# Patient Record
Sex: FEMALE | Race: WHITE | NOT HISPANIC OR LATINO | Employment: OTHER | URBAN - NONMETROPOLITAN AREA
[De-identification: names, ages, dates, MRNs, and addresses within clinical notes are randomized per-mention and may not be internally consistent; named-entity substitution may affect disease eponyms.]

---

## 2017-04-05 RX ORDER — LEVOTHYROXINE SODIUM 0.2 MG/1
200 TABLET ORAL DAILY
COMMUNITY
End: 2022-08-15 | Stop reason: CLARIF

## 2017-04-05 RX ORDER — LITHIUM CARBONATE 600 MG/1
600 CAPSULE ORAL 2 TIMES DAILY WITH MEALS
COMMUNITY
End: 2022-08-15

## 2017-04-05 RX ORDER — QUETIAPINE 400 MG/1
300 TABLET, FILM COATED, EXTENDED RELEASE ORAL NIGHTLY
COMMUNITY

## 2017-04-05 RX ORDER — PROPRANOLOL HYDROCHLORIDE 10 MG/1
20 TABLET ORAL DAILY
COMMUNITY

## 2017-04-05 RX ORDER — HYDROXYZINE PAMOATE 25 MG/1
50 CAPSULE ORAL 3 TIMES DAILY PRN
COMMUNITY

## 2017-04-07 ENCOUNTER — ANESTHESIA EVENT (OUTPATIENT)
Dept: GASTROENTEROLOGY | Facility: HOSPITAL | Age: 52
End: 2017-04-07

## 2017-04-07 ENCOUNTER — ANESTHESIA (OUTPATIENT)
Dept: GASTROENTEROLOGY | Facility: HOSPITAL | Age: 52
End: 2017-04-07

## 2017-04-07 ENCOUNTER — HOSPITAL ENCOUNTER (OUTPATIENT)
Facility: HOSPITAL | Age: 52
Setting detail: HOSPITAL OUTPATIENT SURGERY
Discharge: HOME | End: 2017-04-07
Attending: INTERNAL MEDICINE | Admitting: INTERNAL MEDICINE

## 2017-04-07 VITALS
DIASTOLIC BLOOD PRESSURE: 92 MMHG | HEIGHT: 60 IN | WEIGHT: 196.21 LBS | SYSTOLIC BLOOD PRESSURE: 141 MMHG | BODY MASS INDEX: 38.52 KG/M2 | HEART RATE: 80 BPM | RESPIRATION RATE: 18 BRPM | OXYGEN SATURATION: 98 % | TEMPERATURE: 97.1 F

## 2017-04-07 DIAGNOSIS — K92.2 HEMORRHAGE OF GASTROINTESTINAL TRACT, UNSPECIFIED: ICD-10-CM

## 2017-04-07 DIAGNOSIS — Z80.0 FAMILY HISTORY OF COLON CANCER: ICD-10-CM

## 2017-04-07 PROCEDURE — 25010000002 FENTANYL CITRATE (PF) 100 MCG/2ML SOLUTION: Performed by: NURSE ANESTHETIST, CERTIFIED REGISTERED

## 2017-04-07 PROCEDURE — 25010000002 MIDAZOLAM PER 1 MG: Performed by: NURSE ANESTHETIST, CERTIFIED REGISTERED

## 2017-04-07 PROCEDURE — 25010000002 PROPOFOL 10 MG/ML EMULSION: Performed by: NURSE ANESTHETIST, CERTIFIED REGISTERED

## 2017-04-07 PROCEDURE — 88305 TISSUE EXAM BY PATHOLOGIST: CPT | Performed by: PATHOLOGY

## 2017-04-07 PROCEDURE — 88305 TISSUE EXAM BY PATHOLOGIST: CPT | Performed by: INTERNAL MEDICINE

## 2017-04-07 RX ORDER — DEXTROSE AND SODIUM CHLORIDE 5; .45 G/100ML; G/100ML
20 INJECTION, SOLUTION INTRAVENOUS CONTINUOUS
Status: DISCONTINUED | OUTPATIENT
Start: 2017-04-07 | End: 2017-04-07 | Stop reason: HOSPADM

## 2017-04-07 RX ORDER — PROMETHAZINE HYDROCHLORIDE 25 MG/ML
12.5 INJECTION, SOLUTION INTRAMUSCULAR; INTRAVENOUS ONCE AS NEEDED
Status: DISCONTINUED | OUTPATIENT
Start: 2017-04-07 | End: 2017-04-07 | Stop reason: HOSPADM

## 2017-04-07 RX ORDER — MIDAZOLAM HYDROCHLORIDE 1 MG/ML
INJECTION INTRAMUSCULAR; INTRAVENOUS AS NEEDED
Status: DISCONTINUED | OUTPATIENT
Start: 2017-04-07 | End: 2017-04-07 | Stop reason: SURG

## 2017-04-07 RX ORDER — PROMETHAZINE HYDROCHLORIDE 25 MG/1
25 TABLET ORAL ONCE AS NEEDED
Status: DISCONTINUED | OUTPATIENT
Start: 2017-04-07 | End: 2017-04-07 | Stop reason: HOSPADM

## 2017-04-07 RX ORDER — PROMETHAZINE HYDROCHLORIDE 25 MG/1
25 SUPPOSITORY RECTAL ONCE AS NEEDED
Status: DISCONTINUED | OUTPATIENT
Start: 2017-04-07 | End: 2017-04-07 | Stop reason: HOSPADM

## 2017-04-07 RX ORDER — PROPOFOL 10 MG/ML
VIAL (ML) INTRAVENOUS AS NEEDED
Status: DISCONTINUED | OUTPATIENT
Start: 2017-04-07 | End: 2017-04-07

## 2017-04-07 RX ORDER — ONDANSETRON 2 MG/ML
4 INJECTION INTRAMUSCULAR; INTRAVENOUS ONCE AS NEEDED
Status: DISCONTINUED | OUTPATIENT
Start: 2017-04-07 | End: 2017-04-07 | Stop reason: HOSPADM

## 2017-04-07 RX ORDER — FENTANYL CITRATE 50 UG/ML
INJECTION, SOLUTION INTRAMUSCULAR; INTRAVENOUS AS NEEDED
Status: DISCONTINUED | OUTPATIENT
Start: 2017-04-07 | End: 2017-04-07 | Stop reason: SURG

## 2017-04-07 RX ORDER — SODIUM CHLORIDE, SODIUM GLUCONATE, SODIUM ACETATE, POTASSIUM CHLORIDE, AND MAGNESIUM CHLORIDE 526; 502; 368; 37; 30 MG/100ML; MG/100ML; MG/100ML; MG/100ML; MG/100ML
INJECTION, SOLUTION INTRAVENOUS CONTINUOUS PRN
Status: DISCONTINUED | OUTPATIENT
Start: 2017-04-07 | End: 2017-04-07 | Stop reason: SURG

## 2017-04-07 RX ORDER — DEXAMETHASONE SODIUM PHOSPHATE 4 MG/ML
8 INJECTION, SOLUTION INTRA-ARTICULAR; INTRALESIONAL; INTRAMUSCULAR; INTRAVENOUS; SOFT TISSUE ONCE AS NEEDED
Status: DISCONTINUED | OUTPATIENT
Start: 2017-04-07 | End: 2017-04-07 | Stop reason: HOSPADM

## 2017-04-07 RX ORDER — PROPOFOL 10 MG/ML
VIAL (ML) INTRAVENOUS AS NEEDED
Status: DISCONTINUED | OUTPATIENT
Start: 2017-04-07 | End: 2017-04-07 | Stop reason: SURG

## 2017-04-07 RX ADMIN — PROPOFOL 30 MG: 10 INJECTION, EMULSION INTRAVENOUS at 14:16

## 2017-04-07 RX ADMIN — PROPOFOL 40 MG: 10 INJECTION, EMULSION INTRAVENOUS at 14:08

## 2017-04-07 RX ADMIN — MIDAZOLAM 2 MG: 1 INJECTION INTRAMUSCULAR; INTRAVENOUS at 14:09

## 2017-04-07 RX ADMIN — DEXTROSE AND SODIUM CHLORIDE 20 ML/HR: 5; 450 INJECTION, SOLUTION INTRAVENOUS at 13:00

## 2017-04-07 RX ADMIN — FENTANYL CITRATE 100 MCG: 50 INJECTION, SOLUTION INTRAMUSCULAR; INTRAVENOUS at 14:09

## 2017-04-07 RX ADMIN — SODIUM CHLORIDE, SODIUM GLUCONATE, SODIUM ACETATE, POTASSIUM CHLORIDE, AND MAGNESIUM CHLORIDE: 526; 502; 368; 37; 30 INJECTION, SOLUTION INTRAVENOUS at 14:10

## 2017-04-07 RX ADMIN — PROPOFOL 20 MG: 10 INJECTION, EMULSION INTRAVENOUS at 14:12

## 2017-04-07 NOTE — H&P
Emir Tilley DO,[facility]  Gastroenterology  Hepatology  Endoscopy  Board Certified in Internal Medicine and gastroenterology  [address], [address]  [place]. [address]  - [phone]   F - [phone]     GASTROENTEROLOGY HISTORY AND PHYSICAL  NOTE   EMIR TILLEY DO.         SUBJECTIVE:   4/7/2017    Name: Yahaira Gurrola  DOD: 1965        Chief Complaint:       Subjective : Rectal bleeding with a family history of colon cancer    Patient is 51 y.o. female presents with desire for elective colonoscopy.  The patient has had intermittent rectal bleeding and a family history of colon cancer in mother at 70   ROS/HISTORY/ CURRENT MEDICATIONS/OBJECTIVE/VS/PE:   Review of Systems:   Review of Systems    History:     Past Medical History:   Diagnosis Date   • Bipolar 1 disorder    • Disease of thyroid gland    • PONV (postoperative nausea and vomiting)      Past Surgical History:   Procedure Laterality Date   • COLONOSCOPY     • HYSTERECTOMY       History reviewed. No pertinent family history.  Social History   Substance Use Topics   • Smoking status: Never Smoker   • Smokeless tobacco: Never Used   • Alcohol use No     Prescriptions Prior to Admission   Medication Sig Dispense Refill Last Dose   • hydrOXYzine (VISTARIL) 25 MG capsule Take 25 mg by mouth 3 (Three) Times a Day As Needed for Itching.   4/6/2017 at Unknown time   • levothyroxine (SYNTHROID) 200 MCG tablet Take 200 mcg by mouth Daily.   4/6/2017 at Unknown time   • lithium 600 MG capsule Take 600 mg by mouth 2 (Two) Times a Day With Meals.   4/6/2017 at Unknown time   • propranolol (INDERAL) 10 MG tablet Take 10 mg by mouth 2 (Two) Times a Day As Needed.   4/6/2017 at Unknown time   • QUEtiapine XR (SEROQUEL XR) 400 MG 24 hr tablet Take 800 mg by mouth Every Night.   4/6/2017 at Unknown time     Allergies:  Geodon [ziprasidone hcl] and Sulfa antibiotics    I have reviewed the patients medical history, surgical history and  family history in the available medical record system.     Current Medications:     Current Facility-Administered Medications   Medication Dose Route Frequency Provider Last Rate Last Dose   • dexamethasone (DECADRON) injection 8 mg  8 mg Intravenous Once PRN Mirian Drake CRNA       • dextrose 5 % and sodium chloride 0.45 % infusion  20 mL/hr Intravenous Continuous James Simpson DO 20 mL/hr at 04/07/17 1300 20 mL/hr at 04/07/17 1300   • meperidine (DEMEROL) injection 12.5 mg  12.5 mg Intravenous Q5 Min PRN Mirian Drake CRNA       • ondansetron (ZOFRAN) injection 4 mg  4 mg Intravenous Once PRN Mirian Drake CRNA       • promethazine (PHENERGAN) injection 12.5 mg  12.5 mg Intravenous Once PRN Mirian Drake CRNA        Or   • promethazine (PHENERGAN) injection 12.5 mg  12.5 mg Intramuscular Once PRN Mirian Drake CRNA        Or   • promethazine (PHENERGAN) suppository 25 mg  25 mg Rectal Once PRN Mirian Drake CRNA        Or   • promethazine (PHENERGAN) tablet 25 mg  25 mg Oral Once PRN Mirian Drake CRNA           Objective     Physical Exam:   Temp:  [97.7 °F (36.5 °C)] 97.7 °F (36.5 °C)  Heart Rate:  [73] 73  Resp:  [16] 16  BP: (186)/(87) 186/87    Physical Exam:  General Appearance:    Alert, cooperative, in no acute distress   Head:    Normocephalic, without obvious abnormality, atraumatic   Eyes:            Lids and lashes normal, conjunctivae and sclerae normal, no   icterus, no pallor, corneas clear, PERRLA   Ears:    Ears appear intact with no abnormalities noted   Throat:   No oral lesions, no thrush, oral mucosa moist   Neck:   No adenopathy, supple, trachea midline, no thyromegaly, no     carotid bruit, no JVD   Back:     No kyphosis present, no scoliosis present, no skin lesions,       erythema or scars, no tenderness to percussion or                   palpation,   range of motion normal    Lungs:     Clear to auscultation,respirations regular, even and                   unlabored    Heart:    Regular rhythm and normal rate, normal S1 and S2, no            murmur, no gallop, no rub, no click   Breast Exam:    Deferred   Abdomen:     Normal bowel sounds, no masses, no organomegaly, soft        non-tender, non-distended, no guarding, no rebound                 tenderness   Genitalia:    Deferred   Extremities:   Moves all extremities well, no edema, no cyanosis, no              redness   Pulses:   Pulses palpable and equal bilaterally   Skin:   No bleeding, bruising or rash   Lymph nodes:   No palpable adenopathy   Neurologic:   Cranial nerves 2 - 12 grossly intact, sensation intact, DTR        present and equal bilaterally      Results Review:     Lab Results   Component Value Date    WBC 5.9 04/29/2015    WBC 6.3 03/12/2014    HGB 13.1 04/29/2015    HCT 38.6 04/29/2015     04/29/2015             No results found for: LIPASE  No results found for: INR       Radiology Review:  Imaging Results (last 72 hours)     ** No results found for the last 72 hours. **           I reviewed the patient's new clinical results.  I reviewed the patient's new imaging results and agree with the interpretation.     ASSESSMENT/PLAN:   ASSESSMENT:   1.  Rectal bleeding  2.  Family history of colon cancer    PLAN:   1.  Colonoscopy    Risk and benefits associated with the procedure are reviewed with the patient.  She wishes to proceed      James Simpson DO  04/07/17  2:05 PM

## 2017-04-07 NOTE — PLAN OF CARE
Problem: GI Endoscopy (Adult)  Goal: Signs and Symptoms of Listed Potential Problems Will be Absent or Manageable (GI Endoscopy)  Outcome: Ongoing (interventions implemented as appropriate)    04/07/17 1424   GI Endoscopy   Problems Assessed (GI Endoscopy) all   Problems Present (GI Endoscopy) none         Problem: Patient Care Overview (Adult)  Goal: Plan of Care Review  Outcome: Ongoing (interventions implemented as appropriate)    04/07/17 1424   Coping/Psychosocial Response Interventions   Plan Of Care Reviewed With patient   Patient Care Overview   Progress no change

## 2017-04-07 NOTE — ANESTHESIA POSTPROCEDURE EVALUATION
Patient: Yahaira Gurrola    Procedure Summary     Date Anesthesia Start Anesthesia Stop Room / Location    04/07/17 [id number] [facility] ENDOSCOPY 2 / [facility] ENDOSCOPY       Procedure Diagnosis Surgeon Provider    COLONOSCOPY (N/A ) Hemorrhage of gastrointestinal tract, unspecified; Family history of colon cancer  (FAMILY HX OF COLON CANCER) DO Mirian Putnam CRNA          Anesthesia Type: MAC  Last vitals  BP     Temp      Pulse     Resp      SpO2        Post Anesthesia Care and Evaluation    Patient location during evaluation: bedside  Patient participation: complete - patient participated  Level of consciousness: awake and alert  Pain score: 0  Pain management: adequate  Airway patency: patent  Anesthetic complications: No anesthetic complications  PONV Status: none  Cardiovascular status: acceptable  Respiratory status: acceptable  Hydration status: acceptable

## 2017-04-07 NOTE — PLAN OF CARE
Problem: GI Endoscopy (Adult)  Goal: Signs and Symptoms of Listed Potential Problems Will be Absent or Manageable (GI Endoscopy)  Outcome: Outcome(s) achieved Date Met:  04/07/17 04/07/17 1432   GI Endoscopy   Problems Assessed (GI Endoscopy) all   Problems Present (GI Endoscopy) none         Problem: Patient Care Overview (Adult)  Goal: Plan of Care Review  Outcome: Outcome(s) achieved Date Met:  04/07/17 04/07/17 1432   Coping/Psychosocial Response Interventions   Plan Of Care Reviewed With patient   Patient Care Overview   Progress no change   Outcome Evaluation   Outcome Summary/Follow up Plan vss, pt alert

## 2017-04-07 NOTE — ANESTHESIA PREPROCEDURE EVALUATION
Anesthesia Evaluation     history of anesthetic complications:  NPO Status: > 8 hours   Airway   Mallampati: II  TM distance: >3 FB  Neck ROM: full  no difficulty expected  Dental          Pulmonary - negative pulmonary ROS and normal exam   Cardiovascular - negative cardio ROS and normal exam        Neuro/Psych  (+) psychiatric history,    GI/Hepatic/Renal/Endo - negative ROS     Musculoskeletal (-) negative ROS    Abdominal    Substance History - negative use     OB/GYN negative ob/gyn ROS         Other - negative ROS                                 Anesthesia Plan    ASA 2     MAC     Anesthetic plan and risks discussed with patient.

## 2017-04-10 LAB
LAB AP CASE REPORT: NORMAL
Lab: NORMAL
PATH REPORT.FINAL DX SPEC: NORMAL
PATH REPORT.GROSS SPEC: NORMAL

## 2018-07-11 ENCOUNTER — HOSPITAL ENCOUNTER (OUTPATIENT)
Facility: HOSPITAL | Age: 53
Setting detail: OBSERVATION
Discharge: HOME | End: 2018-07-12
Attending: INTERNAL MEDICINE | Admitting: INTERNAL MEDICINE

## 2018-07-11 PROBLEM — R07.9 CHEST PAIN: Status: ACTIVE | Noted: 2018-07-11

## 2018-07-11 LAB
ALBUMIN SERPL-MCNC: 4.6 G/DL (ref 3.4–4.8)
ALBUMIN/GLOB SERPL: 1.8 G/DL (ref 1.1–1.8)
ALP SERPL-CCNC: 78 U/L (ref 38–126)
ALT SERPL W P-5'-P-CCNC: 69 U/L (ref 9–52)
ANION GAP SERPL CALCULATED.3IONS-SCNC: 12 MMOL/L (ref 5–15)
ARTICHOKE IGE QN: 142 MG/DL (ref 1–129)
AST SERPL-CCNC: 37 U/L (ref 14–36)
BASOPHILS # BLD AUTO: 0.03 10*3/MM3 (ref 0–0.2)
BASOPHILS NFR BLD AUTO: 0.4 % (ref 0–2)
BILIRUB SERPL-MCNC: 0.7 MG/DL (ref 0.2–1.3)
BUN BLD-MCNC: 12 MG/DL (ref 7–21)
BUN/CREAT SERPL: 16.9 (ref 7–25)
CALCIUM SPEC-SCNC: 9.9 MG/DL (ref 8.4–10.2)
CHLORIDE SERPL-SCNC: 111 MMOL/L (ref 95–110)
CHOLEST SERPL-MCNC: 206 MG/DL (ref 0–199)
CO2 SERPL-SCNC: 20 MMOL/L (ref 22–31)
CREAT BLD-MCNC: 0.71 MG/DL (ref 0.5–1)
DEPRECATED RDW RBC AUTO: 40.5 FL (ref 36.4–46.3)
EOSINOPHIL # BLD AUTO: 0.14 10*3/MM3 (ref 0–0.7)
EOSINOPHIL NFR BLD AUTO: 1.9 % (ref 0–7)
ERYTHROCYTE [DISTWIDTH] IN BLOOD BY AUTOMATED COUNT: 12.7 % (ref 11.5–14.5)
GFR SERPL CREATININE-BSD FRML MDRD: 87 ML/MIN/1.73 (ref 51–120)
GLOBULIN UR ELPH-MCNC: 2.5 GM/DL (ref 2.3–3.5)
GLUCOSE BLD-MCNC: 162 MG/DL (ref 60–100)
GLUCOSE BLDC GLUCOMTR-MCNC: 160 MG/DL (ref 70–130)
GLUCOSE BLDC GLUCOMTR-MCNC: 180 MG/DL (ref 70–130)
HBA1C MFR BLD: 6.2 % (ref 4–5.6)
HCT VFR BLD AUTO: 36.6 % (ref 35–45)
HDLC SERPL-MCNC: 53 MG/DL (ref 60–200)
HGB BLD-MCNC: 13.2 G/DL (ref 12–15.5)
IMM GRANULOCYTES # BLD: 0.02 10*3/MM3 (ref 0–0.02)
IMM GRANULOCYTES NFR BLD: 0.3 % (ref 0–0.5)
INR PPP: 1.09 (ref 0.8–1.2)
LDLC/HDLC SERPL: 2.51 {RATIO} (ref 0–3.22)
LITHIUM SERPL-SCNC: 0.7 MMOL/L (ref 0.6–1.2)
LYMPHOCYTES # BLD AUTO: 1.96 10*3/MM3 (ref 0.6–4.2)
LYMPHOCYTES NFR BLD AUTO: 27 % (ref 10–50)
MCH RBC QN AUTO: 31.7 PG (ref 26.5–34)
MCHC RBC AUTO-ENTMCNC: 36.1 G/DL (ref 31.4–36)
MCV RBC AUTO: 87.8 FL (ref 80–98)
MONOCYTES # BLD AUTO: 0.42 10*3/MM3 (ref 0–0.9)
MONOCYTES NFR BLD AUTO: 5.8 % (ref 0–12)
NEUTROPHILS # BLD AUTO: 4.68 10*3/MM3 (ref 2–8.6)
NEUTROPHILS NFR BLD AUTO: 64.6 % (ref 37–80)
NRBC BLD MANUAL-RTO: 0 /100 WBC (ref 0–0)
PLATELET # BLD AUTO: 215 10*3/MM3 (ref 150–450)
PMV BLD AUTO: 10.4 FL (ref 8–12)
POTASSIUM BLD-SCNC: 3.8 MMOL/L (ref 3.5–5.1)
PROT SERPL-MCNC: 7.1 G/DL (ref 6.3–8.6)
PROTHROMBIN TIME: 13.9 SECONDS (ref 11.1–15.3)
RBC # BLD AUTO: 4.17 10*6/MM3 (ref 3.77–5.16)
SODIUM BLD-SCNC: 143 MMOL/L (ref 137–145)
TRIGL SERPL-MCNC: 101 MG/DL (ref 20–199)
TROPONIN I SERPL-MCNC: <0.012 NG/ML
TSH SERPL DL<=0.05 MIU/L-ACNC: 0.34 MIU/ML (ref 0.46–4.68)
WBC NRBC COR # BLD: 7.25 10*3/MM3 (ref 3.2–9.8)

## 2018-07-11 PROCEDURE — 25010000002 ONDANSETRON PER 1 MG: Performed by: INTERNAL MEDICINE

## 2018-07-11 PROCEDURE — 96372 THER/PROPH/DIAG INJ SC/IM: CPT

## 2018-07-11 PROCEDURE — G0378 HOSPITAL OBSERVATION PER HR: HCPCS

## 2018-07-11 PROCEDURE — 80178 ASSAY OF LITHIUM: CPT | Performed by: INTERNAL MEDICINE

## 2018-07-11 PROCEDURE — 63710000001 INSULIN ASPART PER 5 UNITS: Performed by: INTERNAL MEDICINE

## 2018-07-11 PROCEDURE — 93010 ELECTROCARDIOGRAM REPORT: CPT | Performed by: INTERNAL MEDICINE

## 2018-07-11 PROCEDURE — 83036 HEMOGLOBIN GLYCOSYLATED A1C: CPT | Performed by: INTERNAL MEDICINE

## 2018-07-11 PROCEDURE — 85610 PROTHROMBIN TIME: CPT | Performed by: INTERNAL MEDICINE

## 2018-07-11 PROCEDURE — 80053 COMPREHEN METABOLIC PANEL: CPT | Performed by: INTERNAL MEDICINE

## 2018-07-11 PROCEDURE — 93005 ELECTROCARDIOGRAM TRACING: CPT | Performed by: INTERNAL MEDICINE

## 2018-07-11 PROCEDURE — 96374 THER/PROPH/DIAG INJ IV PUSH: CPT

## 2018-07-11 PROCEDURE — 25010000002 ENOXAPARIN PER 10 MG: Performed by: INTERNAL MEDICINE

## 2018-07-11 PROCEDURE — 82962 GLUCOSE BLOOD TEST: CPT

## 2018-07-11 PROCEDURE — 99204 OFFICE O/P NEW MOD 45 MIN: CPT | Performed by: INTERNAL MEDICINE

## 2018-07-11 PROCEDURE — 84443 ASSAY THYROID STIM HORMONE: CPT | Performed by: INTERNAL MEDICINE

## 2018-07-11 PROCEDURE — 85025 COMPLETE CBC W/AUTO DIFF WBC: CPT | Performed by: INTERNAL MEDICINE

## 2018-07-11 PROCEDURE — 96361 HYDRATE IV INFUSION ADD-ON: CPT

## 2018-07-11 PROCEDURE — 80061 LIPID PANEL: CPT | Performed by: INTERNAL MEDICINE

## 2018-07-11 PROCEDURE — 84484 ASSAY OF TROPONIN QUANT: CPT | Performed by: INTERNAL MEDICINE

## 2018-07-11 RX ORDER — ASPIRIN 81 MG/1
81 TABLET ORAL DAILY
Status: DISCONTINUED | OUTPATIENT
Start: 2018-07-12 | End: 2018-07-12 | Stop reason: HOSPADM

## 2018-07-11 RX ORDER — LITHIUM CARBONATE 150 MG/1
600 CAPSULE ORAL 2 TIMES DAILY WITH MEALS
Status: DISCONTINUED | OUTPATIENT
Start: 2018-07-11 | End: 2018-07-12

## 2018-07-11 RX ORDER — ASPIRIN 81 MG/1
324 TABLET, CHEWABLE ORAL ONCE
Status: COMPLETED | OUTPATIENT
Start: 2018-07-11 | End: 2018-07-11

## 2018-07-11 RX ORDER — ATORVASTATIN CALCIUM 20 MG/1
20 TABLET, FILM COATED ORAL NIGHTLY
Status: DISCONTINUED | OUTPATIENT
Start: 2018-07-11 | End: 2018-07-12 | Stop reason: HOSPADM

## 2018-07-11 RX ORDER — PROPRANOLOL HYDROCHLORIDE 10 MG/1
10 TABLET ORAL EVERY 12 HOURS SCHEDULED
Status: DISCONTINUED | OUTPATIENT
Start: 2018-07-11 | End: 2018-07-12 | Stop reason: HOSPADM

## 2018-07-11 RX ORDER — ONDANSETRON 2 MG/ML
4 INJECTION INTRAMUSCULAR; INTRAVENOUS EVERY 6 HOURS PRN
Status: DISCONTINUED | OUTPATIENT
Start: 2018-07-11 | End: 2018-07-12 | Stop reason: HOSPADM

## 2018-07-11 RX ORDER — MORPHINE SULFATE 2 MG/ML
1 INJECTION, SOLUTION INTRAMUSCULAR; INTRAVENOUS EVERY 4 HOURS PRN
Status: DISCONTINUED | OUTPATIENT
Start: 2018-07-11 | End: 2018-07-12 | Stop reason: HOSPADM

## 2018-07-11 RX ORDER — SODIUM CHLORIDE 9 MG/ML
75 INJECTION, SOLUTION INTRAVENOUS CONTINUOUS
Status: DISCONTINUED | OUTPATIENT
Start: 2018-07-11 | End: 2018-07-12 | Stop reason: HOSPADM

## 2018-07-11 RX ORDER — METOPROLOL TARTRATE 5 MG/5ML
2.5 INJECTION INTRAVENOUS ONCE AS NEEDED
Status: DISCONTINUED | OUTPATIENT
Start: 2018-07-11 | End: 2018-07-12 | Stop reason: HOSPADM

## 2018-07-11 RX ORDER — LEVOTHYROXINE SODIUM 0.1 MG/1
200 TABLET ORAL DAILY
Status: DISCONTINUED | OUTPATIENT
Start: 2018-07-11 | End: 2018-07-12 | Stop reason: HOSPADM

## 2018-07-11 RX ORDER — CALCIUM CARBONATE 200(500)MG
1 TABLET,CHEWABLE ORAL 3 TIMES DAILY PRN
Status: DISCONTINUED | OUTPATIENT
Start: 2018-07-11 | End: 2018-07-12 | Stop reason: HOSPADM

## 2018-07-11 RX ORDER — ACETAMINOPHEN 325 MG/1
650 TABLET ORAL EVERY 6 HOURS PRN
Status: DISCONTINUED | OUTPATIENT
Start: 2018-07-11 | End: 2018-07-12 | Stop reason: HOSPADM

## 2018-07-11 RX ORDER — HYDROXYZINE PAMOATE 25 MG/1
25 CAPSULE ORAL 3 TIMES DAILY PRN
Status: DISCONTINUED | OUTPATIENT
Start: 2018-07-11 | End: 2018-07-12 | Stop reason: HOSPADM

## 2018-07-11 RX ORDER — DIPHENHYDRAMINE HYDROCHLORIDE 50 MG/ML
25 INJECTION INTRAMUSCULAR; INTRAVENOUS ONCE
Status: DISCONTINUED | OUTPATIENT
Start: 2018-07-11 | End: 2018-07-12 | Stop reason: HOSPADM

## 2018-07-11 RX ORDER — DEXTROSE MONOHYDRATE 25 G/50ML
25 INJECTION, SOLUTION INTRAVENOUS
Status: DISCONTINUED | OUTPATIENT
Start: 2018-07-11 | End: 2018-07-12 | Stop reason: HOSPADM

## 2018-07-11 RX ORDER — NALOXONE HCL 0.4 MG/ML
0.4 VIAL (ML) INJECTION
Status: DISCONTINUED | OUTPATIENT
Start: 2018-07-11 | End: 2018-07-12 | Stop reason: HOSPADM

## 2018-07-11 RX ORDER — NITROGLYCERIN 0.4 MG/1
0.4 TABLET SUBLINGUAL
Status: DISCONTINUED | OUTPATIENT
Start: 2018-07-11 | End: 2018-07-12 | Stop reason: HOSPADM

## 2018-07-11 RX ORDER — BISACODYL 5 MG/1
5 TABLET, DELAYED RELEASE ORAL DAILY PRN
Status: DISCONTINUED | OUTPATIENT
Start: 2018-07-11 | End: 2018-07-12 | Stop reason: HOSPADM

## 2018-07-11 RX ORDER — LOSARTAN POTASSIUM 25 MG/1
25 TABLET ORAL
Status: DISCONTINUED | OUTPATIENT
Start: 2018-07-11 | End: 2018-07-12 | Stop reason: HOSPADM

## 2018-07-11 RX ORDER — SODIUM CHLORIDE 0.9 % (FLUSH) 0.9 %
1-10 SYRINGE (ML) INJECTION AS NEEDED
Status: DISCONTINUED | OUTPATIENT
Start: 2018-07-11 | End: 2018-07-12 | Stop reason: HOSPADM

## 2018-07-11 RX ORDER — HYDRALAZINE HYDROCHLORIDE 20 MG/ML
10 INJECTION INTRAMUSCULAR; INTRAVENOUS EVERY 6 HOURS PRN
Status: DISCONTINUED | OUTPATIENT
Start: 2018-07-11 | End: 2018-07-12 | Stop reason: HOSPADM

## 2018-07-11 RX ORDER — NICOTINE POLACRILEX 4 MG
15 LOZENGE BUCCAL
Status: DISCONTINUED | OUTPATIENT
Start: 2018-07-11 | End: 2018-07-12 | Stop reason: HOSPADM

## 2018-07-11 RX ADMIN — ENOXAPARIN SODIUM 40 MG: 40 INJECTION SUBCUTANEOUS at 16:54

## 2018-07-11 RX ADMIN — LOSARTAN POTASSIUM 25 MG: 25 TABLET ORAL at 16:55

## 2018-07-11 RX ADMIN — HYDROXYZINE PAMOATE 25 MG: 25 CAPSULE ORAL at 16:55

## 2018-07-11 RX ADMIN — ASPIRIN 81 MG 324 MG: 81 TABLET ORAL at 16:55

## 2018-07-11 RX ADMIN — ATORVASTATIN CALCIUM 20 MG: 20 TABLET, FILM COATED ORAL at 20:43

## 2018-07-11 RX ADMIN — SODIUM CHLORIDE 75 ML/HR: 9 INJECTION, SOLUTION INTRAVENOUS at 16:56

## 2018-07-11 RX ADMIN — QUETIAPINE 800 MG: 100 TABLET ORAL at 21:13

## 2018-07-11 RX ADMIN — INSULIN ASPART 2 UNITS: 100 INJECTION, SOLUTION INTRAVENOUS; SUBCUTANEOUS at 16:58

## 2018-07-11 RX ADMIN — PROPRANOLOL HYDROCHLORIDE 10 MG: 10 TABLET ORAL at 20:43

## 2018-07-11 RX ADMIN — LITHIUM CARBONATE 600 MG: 150 CAPSULE, GELATIN COATED ORAL at 17:01

## 2018-07-11 RX ADMIN — ONDANSETRON 4 MG: 2 INJECTION, SOLUTION INTRAMUSCULAR; INTRAVENOUS at 16:55

## 2018-07-11 RX ADMIN — INSULIN ASPART 2 UNITS: 100 INJECTION, SOLUTION INTRAVENOUS; SUBCUTANEOUS at 20:43

## 2018-07-11 NOTE — CONSULTS
Cardiology at [facility]  Cardiovascular Consultation Note      Yahaira Gurrola  418/1  [id number]  1965    DATE OF ADMISSION: 7/11/2018  DATE OF CONSULTATION:  7/11/2018    Harmeet Ríos MD  Treatment Team:   Attending Provider: Quique Horne MD  Admitting Provider: Quique Horne MD    No chief complaint on file.      Chief complaint/ Reason for Consultation: Chest pain with risk factor      History of Present Illness   52 y.o. female with history of bipolar disorder, hypothyroidism, diabetes mellitus, obesity ,family history of heart disease transferred from [facility] to [facility] due to chest pain.  Patient has had chest pain off and on for the past 1 month but the pain got worse today.  Pain did radiate to the left upper extremity and was associated with nausea, diaphoresis and shortness of air. She denies vomiting, fever, chills, hematemesis, melena, hematochezia, palpitation, syncope or presyncope.  She had investigations at [facility] emergency department and noted to have normal initial troponin and unremarkable CBC and electrolytes.  Chest x-ray was normal except for cardiomegaly and EKG showed sinus bradycardia with nonspecific ST-T wave changes.  She was treated with aspirin and nitroglycerin patch was placed prior to transfer.   Past Medical History:   Diagnosis Date   • Bipolar 1 disorder (CMS/HCC)    • Disease of thyroid gland    • PONV (postoperative nausea and vomiting)        Past Surgical History:   Procedure Laterality Date   • COLONOSCOPY     • COLONOSCOPY N/A 4/7/2017    Procedure: COLONOSCOPY;  Surgeon: James Simpson DO;  Location: [facility] ENDOSCOPY;  Service:    • HYSTERECTOMY         Allergies   Allergen Reactions   • Geodon [Ziprasidone Hcl] Nausea And Vomiting   • Sulfa Antibiotics Nausea And Vomiting       No current facility-administered medications on file prior to encounter.      Current Outpatient Prescriptions on  File Prior to Encounter   Medication Sig Dispense Refill   • hydrOXYzine (VISTARIL) 25 MG capsule Take 25 mg by mouth 3 (Three) Times a Day As Needed for Itching.     • levothyroxine (SYNTHROID) 200 MCG tablet Take 200 mcg by mouth Daily.     • lithium 600 MG capsule Take 600 mg by mouth 2 (Two) Times a Day With Meals.     • propranolol (INDERAL) 10 MG tablet Take 10 mg by mouth 2 (Two) Times a Day As Needed.     • QUEtiapine XR (SEROQUEL XR) 400 MG 24 hr tablet Take 800 mg by mouth Every Night.         Social History     Social History   • Marital status:      Spouse name: N/A   • Number of children: N/A   • Years of education: N/A     Occupational History   • Not on file.     Social History Main Topics   • Smoking status: Never Smoker   • Smokeless tobacco: Never Used   • Alcohol use No   • Drug use: No   • Sexual activity: Defer     Other Topics Concern   • Not on file     Social History Narrative   • No narrative on file           REVIEW OF SYSTEMS:   ROS      Constitutional: Positive for activity change and diaphoresis. Negative for appetite change, chills, fatigue and fever.   HENT: Negative for trouble swallowing and voice change.    Eyes: Negative for photophobia and visual disturbance.   Respiratory: Positive for shortness of breath. Negative for cough, choking,   Cardiovascular: Positive for chest pain. Negative for palpitations and leg swelling.   Gastrointestinal: Positive for nausea. Negative for abdominal distention,.   Endocrine: Negative for cold intolerance, heat intolerance, polydipsia  Genitourinary: Negative for decreased urine volume, difficulty urinating, dysuria, enuresis, flank pain, frequency, hematuria and urgency.   Musculoskeletal: Negative for arthralgias, gait problem, myalgias, neck pain and neck stiffness.   Skin: Negative for pallor, rash and wound.   Neurological: Negative for dizziness, tremors, seizures, syncope,  Hematological: Does not bruise/bleed easily.  "  Psychiatric/Behavioral: Negative for agitation, behavioral problems   Objective:     Vitals:    07/11/18 1500 07/11/18 1652 07/11/18 1723   BP: (!) 188/78 142/68    Pulse: 55  70   Resp: 22     Temp: 96.7 °F (35.9 °C)     TempSrc: Oral     SpO2: 100%     Weight: 87.8 kg (193 lb 9.6 oz)     Height: 154.9 cm (61\")       Body mass index is 36.58 kg/m².  Flowsheet Rows      First Filed Value   Admission Height  154.9 cm (61\") Documented at 07/11/2018 1500   Admission Weight  87.8 kg (193 lb 9.6 oz) Documented at 07/11/2018 1500        No intake or output data in the 24 hours ending 07/11/18 1803      Physical Exam   Physical Exam  Constitutional: She is oriented to person, place, and time. She appears well-developed and well-nourished. She is cooperative. No distress.   Patient is obese.   HENT:   Head: Normocephalic and atraumatic.     Neck: Normal range of motion. Neck supple. No JVD present. No thyromegaly present.   Cardiovascular: Normal rate, regular rhythm, normal heart sounds and intact distal pulses.  Exam reveals no gallop and no friction rub.    No murmur heard.  Pulmonary/Chest: Effort normal and breath sounds normal. No stridor. No respiratory distress. She has no wheezes. She has no rales. She exhibits no tenderness.     Abdominal: Soft. Bowel sounds are normal. She exhibits no distension and no mass. There is no tenderness. There is no rebound and no guarding. No hernia.   Musculoskeletal: Normal range of motion. She exhibits no edema, tenderness or deformity.   Neurological: She is alert and oriented to person, place, and time. She has normal reflexes. She displays normal reflexes. No cranial nerve deficit or sensory deficit. She exhibits normal muscle tone. Coordination normal.   Skin: Skin is warm and dry. No rash noted. She is not diaphoretic. No erythema. No pallor.   Psychiatric: She has a normal mood and affect. Her behavior is normal  Radiology Review    No orders to display       Lab " Results:  Lab Results (last 24 hours)     Procedure Component Value Units Date/Time    POC Glucose Once [[id number]]  (Abnormal) Collected:  07/11/18 1656    Specimen:  Blood Updated:  07/11/18 1708     Glucose 180 (H) mg/dL      Comment: RN NotifiedMeter: [id number]: [id number] HUGO PATEL       Troponin [[id number]]  (Normal) Collected:  07/11/18 1611    Specimen:  Blood Updated:  07/11/18 1659     Troponin I <0.012 ng/mL     Lipid Panel [[id number]]  (Abnormal) Collected:  07/11/18 1611    Specimen:  Blood Updated:  07/11/18 1657     Total Cholesterol 206 (H) mg/dL      Triglycerides 101 mg/dL      HDL Cholesterol 53 (L) mg/dL      LDL Cholesterol  142 (H) mg/dL      LDL/HDL Ratio 2.51    Protime-INR [[id number]]  (Normal) Collected:  07/11/18 1611    Specimen:  Blood Updated:  07/11/18 1641     Protime 13.9 Seconds      INR 1.09    Narrative:       Therapeutic range for most indications is 2.0-3.0 INR,  or 2.5-3.5 for mechanical heart valves.    Lithium Level [[id number]]  (Normal) Collected:  07/11/18 1611    Specimen:  Blood Updated:  07/11/18 1641     Lithium 0.7 mmol/L     Comprehensive Metabolic Panel [[id number]]  (Abnormal) Collected:  07/11/18 1611    Specimen:  Blood Updated:  07/11/18 1640     Glucose 162 (H) mg/dL      BUN 12 mg/dL      Creatinine 0.71 mg/dL      Sodium 143 mmol/L      Potassium 3.8 mmol/L      Chloride 111 (H) mmol/L      CO2 20.0 (L) mmol/L      Calcium 9.9 mg/dL      Total Protein 7.1 g/dL      Albumin 4.60 g/dL      ALT (SGPT) 69 (H) U/L      AST (SGOT) 37 (H) U/L      Alkaline Phosphatase 78 U/L      Total Bilirubin 0.7 mg/dL      eGFR Non African Amer 87 mL/min/1.73      Globulin 2.5 gm/dL      A/G Ratio 1.8 g/dL      BUN/Creatinine Ratio 16.9     Anion Gap 12.0 mmol/L     CBC & Differential [[id number]] Collected:  07/11/18 1611    Specimen:  Blood Updated:  07/11/18 1622    Narrative:       The following orders were created for panel order CBC & Differential.  Procedure                                Abnormality         Status                     ---------                               -----------         ------                     CBC Auto Differential[[id number]]        Abnormal            Final result                 Please view results for these tests on the individual orders.    CBC Auto Differential [[id number]]  (Abnormal) Collected:  07/11/18 1611    Specimen:  Blood Updated:  07/11/18 1622     WBC 7.25 10*3/mm3      RBC 4.17 10*6/mm3      Hemoglobin 13.2 g/dL      Hematocrit 36.6 %      MCV 87.8 fL      MCH 31.7 pg      MCHC 36.1 (H) g/dL      RDW 12.7 %      RDW-SD 40.5 fl      MPV 10.4 fL      Platelets 215 10*3/mm3      Neutrophil % 64.6 %      Lymphocyte % 27.0 %      Monocyte % 5.8 %      Eosinophil % 1.9 %      Basophil % 0.4 %      Immature Grans % 0.3 %      Neutrophils, Absolute 4.68 10*3/mm3      Lymphocytes, Absolute 1.96 10*3/mm3      Monocytes, Absolute 0.42 10*3/mm3      Eosinophils, Absolute 0.14 10*3/mm3      Basophils, Absolute 0.03 10*3/mm3      Immature Grans, Absolute 0.02 10*3/mm3      nRBC 0.0 /100 WBC     TSH [[id number]] Collected:  07/11/18 1611    Specimen:  Blood Updated:  07/11/18 1619    Hemoglobin A1c [[id number]] Collected:  07/11/18 1611    Specimen:  Blood Updated:  07/11/18 1617          I personally viewed and interpreted the patient's EKG/Telemetry data       Assessment/Plan:       #1 chest pain described as a pressure to sharp in Quality with radiation to left upper extremity #2 hypertension #3 diabetes #4 strong family history of coronary to disease#5 #5 bipolar disorder #6 hyperlipidemia    52 years old patient with BMI 36 multiple risk factor for CAD admitted for evaluation chest pain off and on for proximal and a month later to get worse in duration and severity.  The patient have  initial normal troponin she is resting comfortably in the bed.  EKG sinus rhythm with a nonspecific ST-T wave changes.  Given the multiple risk factors of  hypertension, diabetes, strong family to our heart disease and postmenopausal status seemed appropriate to further risk stratify with a CT coronary angiogram.  Pros and cons of this option discussed with the patient understand willing to proceed forward.  Clinically, patient being treated with aspirin, atorvastatin for management of hyperlipidemia, Lovenox for DVT prophylaxis Synthroid for hypothyroidism, losartan for management of hypertension and lithium with history of bipolar disorder.  Further recommendation based per patient clinical condition and our outcome of CT coronary angiogram    Thank you for allowing me to participate in the care of Yahaira Gurrola. Feel free to contact me directly with any further questions or concerns.    Tylor Rob MD  07/11/18  6:03 PM.      EMR Dragon/Transcription disclaimer:   Much of this encounter note is an electronic transcription/translation of spoken language to printed text. The electronic translation of spoken language may permit erroneous, or at times, nonsensical words or phrases to be inadvertently transcribed; Although I have reviewed the note for such errors, some may still exist.

## 2018-07-11 NOTE — H&P
[facility] Medicine Services  INPATIENT HISTORY AND PHYSICAL       Patient Care Team:  Harmeet Ríos MD as PCP - General (Family Medicine)    Chief complaint   Chest pain.    Subjective     Patient is a 52 y.o. female with history of bipolar disorder, hypothyroidism, diabetes mellitus, obesity transferred from [facility] to [facility] due to chest pain.  Patient has had chest pain off and on for the past 1 month but the pain got worse today.  Pain did radiate to the left upper extremity and was associated with nausea, diaphoresis and shortness of air. She denies vomiting, fever, chills, hematemesis, melena, hematochezia, palpitation, syncope or presyncope.  She had investigations at [facility] emergency department and noted to have normal initial troponin and unremarkable CBC and electrolytes.  Chest x-ray was normal except for cardiomegaly and EKG showed sinus bradycardia with nonspecific ST-T wave changes.  She was treated with aspirin and nitroglycerin patch was placed prior to transfer.   She still does complain of chest pain but less in severity.    Family and social history: Patient is  and lives with her .  She denies history of alcohol or tobacco use.  There is family history of heart disease hypertension and diabetes.      Review of Systems   Constitutional: Positive for activity change and diaphoresis. Negative for appetite change, chills, fatigue and fever.   HENT: Negative for trouble swallowing and voice change.    Eyes: Negative for photophobia and visual disturbance.   Respiratory: Positive for shortness of breath. Negative for cough, choking, chest tightness, wheezing and stridor.    Cardiovascular: Positive for chest pain. Negative for palpitations and leg swelling.   Gastrointestinal: Positive for nausea. Negative for abdominal distention, abdominal pain, blood in stool, constipation, diarrhea and vomiting.    Endocrine: Negative for cold intolerance, heat intolerance, polydipsia, polyphagia and polyuria.   Genitourinary: Negative for decreased urine volume, difficulty urinating, dysuria, enuresis, flank pain, frequency, hematuria and urgency.   Musculoskeletal: Negative for arthralgias, gait problem, myalgias, neck pain and neck stiffness.   Skin: Negative for pallor, rash and wound.   Neurological: Negative for dizziness, tremors, seizures, syncope, facial asymmetry, speech difficulty, weakness, light-headedness, numbness and headaches.   Hematological: Does not bruise/bleed easily.   Psychiatric/Behavioral: Negative for agitation, behavioral problems and confusion.         History  Past Medical History:   Diagnosis Date   • Bipolar 1 disorder (CMS/HCC)    • Disease of thyroid gland    • PONV (postoperative nausea and vomiting)      Past Surgical History:   Procedure Laterality Date   • COLONOSCOPY     • COLONOSCOPY N/A 4/7/2017    Procedure: COLONOSCOPY;  Surgeon: James Simpson DO;  Location: [facility] ENDOSCOPY;  Service:    • HYSTERECTOMY       No family history on file.  Social History   Substance Use Topics   • Smoking status: Never Smoker   • Smokeless tobacco: Never Used   • Alcohol use No     Prescriptions Prior to Admission   Medication Sig Dispense Refill Last Dose   • hydrOXYzine (VISTARIL) 25 MG capsule Take 25 mg by mouth 3 (Three) Times a Day As Needed for Itching.   7/11/2018 at Unknown time   • levothyroxine (SYNTHROID) 200 MCG tablet Take 200 mcg by mouth Daily.   7/11/2018 at Unknown time   • lithium 600 MG capsule Take 600 mg by mouth 2 (Two) Times a Day With Meals.   7/11/2018 at Unknown time   • propranolol (INDERAL) 10 MG tablet Take 10 mg by mouth 2 (Two) Times a Day As Needed.   7/11/2018 at Unknown time   • QUEtiapine XR (SEROQUEL XR) 400 MG 24 hr tablet Take 800 mg by mouth Every Night.   7/10/2018 at Unknown time     Allergies:  Geodon [ziprasidone hcl] and Sulfa antibiotics  Prior to  Admission medications    Medication Sig Start Date End Date Taking? Authorizing Provider   hydrOXYzine (VISTARIL) 25 MG capsule Take 25 mg by mouth 3 (Three) Times a Day As Needed for Itching.   Yes Historical Provider, MD   levothyroxine (SYNTHROID) 200 MCG tablet Take 200 mcg by mouth Daily.   Yes Historical Provider, MD   lithium 600 MG capsule Take 600 mg by mouth 2 (Two) Times a Day With Meals.   Yes Historical Provider, MD   propranolol (INDERAL) 10 MG tablet Take 10 mg by mouth 2 (Two) Times a Day As Needed.   Yes Historical Provider, MD   QUEtiapine XR (SEROQUEL XR) 400 MG 24 hr tablet Take 800 mg by mouth Every Night.   Yes Historical Provider, MD       Objective        Vital Signs    Temp:  [96.7 °F (35.9 °C)] 96.7 °F (35.9 °C)  Heart Rate:  [55] 55  Resp:  [22] 22  BP: (188)/(78) 188/78    Physical Exam:    Physical Exam   Constitutional: She is oriented to person, place, and time. She appears well-developed and well-nourished. She is cooperative. No distress.   Patient is obese.   HENT:   Head: Normocephalic and atraumatic.   Right Ear: External ear normal.   Left Ear: External ear normal.   Nose: Nose normal.   Mouth/Throat: Oropharynx is clear and moist.   Eyes: Conjunctivae and EOM are normal. Pupils are equal, round, and reactive to light.   Neck: Normal range of motion. Neck supple. No JVD present. No thyromegaly present.   Cardiovascular: Normal rate, regular rhythm, normal heart sounds and intact distal pulses.  Exam reveals no gallop and no friction rub.    No murmur heard.  Pulmonary/Chest: Effort normal and breath sounds normal. No stridor. No respiratory distress. She has no wheezes. She has no rales. She exhibits no tenderness.   There is reproducible pain on the left anterior chest wall.   Abdominal: Soft. Bowel sounds are normal. She exhibits no distension and no mass. There is no tenderness. There is no rebound and no guarding. No hernia.   Musculoskeletal: Normal range of motion. She  exhibits no edema, tenderness or deformity.   Neurological: She is alert and oriented to person, place, and time. She has normal reflexes. She displays normal reflexes. No cranial nerve deficit or sensory deficit. She exhibits normal muscle tone. Coordination normal.   Skin: Skin is warm and dry. No rash noted. She is not diaphoretic. No erythema. No pallor.   Psychiatric: She has a normal mood and affect. Her behavior is normal. Judgment and thought content normal.   Nursing note and vitals reviewed.      Results Review: Pending          Invalid input(s): LABALBU, PROT                    Imaging Results (last 7 days)     ** No results found for the last 168 hours. **          Assessment / Plan       Hospital Problem List:  Active Problems:   - Chest pain: Admit to rule out myocardial infarction.  Begin guidelines directed medical therapy, trend troponin, ECG, check lipid profile, echocardiogram and consult cardiologist.  - Bipolar disorder: Continue home medications.Check lithium level.  - Hypothyroidism: Continue Synthroid.  Check TSH.  - Diabetes mellitus: Begin Accu-Cheks and sliding scale insulin.  Check hemoglobin A1c.  - Continue GI and DVT prophylaxis.  - Further diagnostic studies or treatment plans as hospital course dictates.      I discussed the patient's findings and my recommendations with patient and her .     Quique Horne MD  07/11/18  3:58 PM        Total Time Spent: 50 minutes    EMR Dragon/Transcription disclaimer:   Much of this encounter note is an electronic transcription/translation of spoken language to printed text. The electronic translation of spoken language may permit erroneous, or at times, nonsensical words or phrases to be inadvertently transcribed; Although I have reviewed the note for such errors, some may still exist.

## 2018-07-12 ENCOUNTER — APPOINTMENT (OUTPATIENT)
Dept: CT IMAGING | Facility: HOSPITAL | Age: 53
End: 2018-07-12

## 2018-07-12 ENCOUNTER — APPOINTMENT (OUTPATIENT)
Dept: CARDIOLOGY | Facility: HOSPITAL | Age: 53
End: 2018-07-12
Attending: INTERNAL MEDICINE

## 2018-07-12 VITALS
SYSTOLIC BLOOD PRESSURE: 186 MMHG | WEIGHT: 193.6 LBS | RESPIRATION RATE: 18 BRPM | TEMPERATURE: 99.5 F | HEIGHT: 61 IN | HEART RATE: 63 BPM | BODY MASS INDEX: 36.55 KG/M2 | OXYGEN SATURATION: 97 % | DIASTOLIC BLOOD PRESSURE: 91 MMHG

## 2018-07-12 PROBLEM — F31.9 BIPOLAR DISORDER: Status: ACTIVE | Noted: 2018-07-12

## 2018-07-12 PROBLEM — I10 HTN (HYPERTENSION): Status: ACTIVE | Noted: 2018-07-12

## 2018-07-12 LAB
ANION GAP SERPL CALCULATED.3IONS-SCNC: 8 MMOL/L (ref 5–15)
BASOPHILS # BLD AUTO: 0.04 10*3/MM3 (ref 0–0.2)
BASOPHILS NFR BLD AUTO: 0.5 % (ref 0–2)
BH CV ECHO MEAS - ACS: 2.2 CM
BH CV ECHO MEAS - AO MAX PG (FULL): 5.7 MMHG
BH CV ECHO MEAS - AO MAX PG: 9.6 MMHG
BH CV ECHO MEAS - AO MEAN PG (FULL): 3 MMHG
BH CV ECHO MEAS - AO MEAN PG: 5 MMHG
BH CV ECHO MEAS - AO ROOT AREA (BSA CORRECTED): 1.7
BH CV ECHO MEAS - AO ROOT AREA: 8 CM^2
BH CV ECHO MEAS - AO ROOT DIAM: 3.2 CM
BH CV ECHO MEAS - AO V2 MAX: 155 CM/SEC
BH CV ECHO MEAS - AO V2 MEAN: 99.9 CM/SEC
BH CV ECHO MEAS - AO V2 VTI: 27 CM
BH CV ECHO MEAS - AVA(I,A): 2.2 CM^2
BH CV ECHO MEAS - AVA(I,D): 2.2 CM^2
BH CV ECHO MEAS - AVA(V,A): 2 CM^2
BH CV ECHO MEAS - AVA(V,D): 2 CM^2
BH CV ECHO MEAS - BSA(HAYCOCK): 2 M^2
BH CV ECHO MEAS - BSA: 1.9 M^2
BH CV ECHO MEAS - BZI_BMI: 36.5 KILOGRAMS/M^2
BH CV ECHO MEAS - BZI_METRIC_HEIGHT: 154.9 CM
BH CV ECHO MEAS - BZI_METRIC_WEIGHT: 87.5 KG
BH CV ECHO MEAS - EDV(CUBED): 136.6 ML
BH CV ECHO MEAS - EDV(TEICH): 126.6 ML
BH CV ECHO MEAS - EF(CUBED): 85.7 %
BH CV ECHO MEAS - EF(TEICH): 78.9 %
BH CV ECHO MEAS - ESV(CUBED): 19.5 ML
BH CV ECHO MEAS - ESV(TEICH): 26.8 ML
BH CV ECHO MEAS - FS: 47.8 %
BH CV ECHO MEAS - IVS/LVPW: 1.1
BH CV ECHO MEAS - IVSD: 1.2 CM
BH CV ECHO MEAS - LA DIMENSION: 4.1 CM
BH CV ECHO MEAS - LA/AO: 1.3
BH CV ECHO MEAS - LV MASS(C)D: 242.2 GRAMS
BH CV ECHO MEAS - LV MASS(C)DI: 130.2 GRAMS/M^2
BH CV ECHO MEAS - LV MAX PG: 3.9 MMHG
BH CV ECHO MEAS - LV MEAN PG: 2 MMHG
BH CV ECHO MEAS - LV V1 MAX: 99 CM/SEC
BH CV ECHO MEAS - LV V1 MEAN: 71.5 CM/SEC
BH CV ECHO MEAS - LV V1 VTI: 18.9 CM
BH CV ECHO MEAS - LVIDD: 5.2 CM
BH CV ECHO MEAS - LVIDS: 2.7 CM
BH CV ECHO MEAS - LVOT AREA (M): 3.1 CM^2
BH CV ECHO MEAS - LVOT AREA: 3.1 CM^2
BH CV ECHO MEAS - LVOT DIAM: 2 CM
BH CV ECHO MEAS - LVPWD: 1.2 CM
BH CV ECHO MEAS - MR MAX PG: 19.9 MMHG
BH CV ECHO MEAS - MR MAX VEL: 223 CM/SEC
BH CV ECHO MEAS - MV A MAX VEL: 72.8 CM/SEC
BH CV ECHO MEAS - MV DEC SLOPE: 540 CM/SEC^2
BH CV ECHO MEAS - MV E MAX VEL: 97.5 CM/SEC
BH CV ECHO MEAS - MV E/A: 1.3
BH CV ECHO MEAS - MV MAX PG: 5.7 MMHG
BH CV ECHO MEAS - MV MEAN PG: 2 MMHG
BH CV ECHO MEAS - MV P1/2T MAX VEL: 119 CM/SEC
BH CV ECHO MEAS - MV P1/2T: 64.5 MSEC
BH CV ECHO MEAS - MV V2 MAX: 119 CM/SEC
BH CV ECHO MEAS - MV V2 MEAN: 68.3 CM/SEC
BH CV ECHO MEAS - MV V2 VTI: 24.9 CM
BH CV ECHO MEAS - MVA P1/2T LCG: 1.8 CM^2
BH CV ECHO MEAS - MVA(P1/2T): 3.4 CM^2
BH CV ECHO MEAS - MVA(VTI): 2.4 CM^2
BH CV ECHO MEAS - PA MAX PG: 6.2 MMHG
BH CV ECHO MEAS - PA V2 MAX: 124 CM/SEC
BH CV ECHO MEAS - RAP SYSTOLE: 10 MMHG
BH CV ECHO MEAS - RVDD: 2.6 CM
BH CV ECHO MEAS - RVSP: 37.2 MMHG
BH CV ECHO MEAS - SI(AO): 116.7 ML/M^2
BH CV ECHO MEAS - SI(CUBED): 62.9 ML/M^2
BH CV ECHO MEAS - SI(LVOT): 31.9 ML/M^2
BH CV ECHO MEAS - SI(TEICH): 53.7 ML/M^2
BH CV ECHO MEAS - SV(AO): 217.1 ML
BH CV ECHO MEAS - SV(CUBED): 117.1 ML
BH CV ECHO MEAS - SV(LVOT): 59.4 ML
BH CV ECHO MEAS - SV(TEICH): 99.9 ML
BH CV ECHO MEAS - TR MAX VEL: 261 CM/SEC
BUN BLD-MCNC: 10 MG/DL (ref 7–21)
BUN/CREAT SERPL: 14.1 (ref 7–25)
CALCIUM SPEC-SCNC: 8.9 MG/DL (ref 8.4–10.2)
CHLORIDE SERPL-SCNC: 114 MMOL/L (ref 95–110)
CO2 SERPL-SCNC: 21 MMOL/L (ref 22–31)
CREAT BLD-MCNC: 0.71 MG/DL (ref 0.5–1)
DEPRECATED RDW RBC AUTO: 43.2 FL (ref 36.4–46.3)
EOSINOPHIL # BLD AUTO: 0.18 10*3/MM3 (ref 0–0.7)
EOSINOPHIL NFR BLD AUTO: 2.2 % (ref 0–7)
ERYTHROCYTE [DISTWIDTH] IN BLOOD BY AUTOMATED COUNT: 13.1 % (ref 11.5–14.5)
GFR SERPL CREATININE-BSD FRML MDRD: 87 ML/MIN/1.73 (ref 51–120)
GLUCOSE BLD-MCNC: 135 MG/DL (ref 60–100)
GLUCOSE BLDC GLUCOMTR-MCNC: 136 MG/DL (ref 70–130)
GLUCOSE BLDC GLUCOMTR-MCNC: 146 MG/DL (ref 70–130)
GLUCOSE BLDC GLUCOMTR-MCNC: 169 MG/DL (ref 70–130)
HCT VFR BLD AUTO: 33.8 % (ref 35–45)
HGB BLD-MCNC: 11.8 G/DL (ref 12–15.5)
IMM GRANULOCYTES # BLD: 0.03 10*3/MM3 (ref 0–0.02)
IMM GRANULOCYTES NFR BLD: 0.4 % (ref 0–0.5)
LV EF 2D ECHO EST: 61 %
LYMPHOCYTES # BLD AUTO: 2.79 10*3/MM3 (ref 0.6–4.2)
LYMPHOCYTES NFR BLD AUTO: 34.2 % (ref 10–50)
MAXIMAL PREDICTED HEART RATE: 170 BPM
MCH RBC QN AUTO: 31.6 PG (ref 26.5–34)
MCHC RBC AUTO-ENTMCNC: 34.9 G/DL (ref 31.4–36)
MCV RBC AUTO: 90.4 FL (ref 80–98)
MONOCYTES # BLD AUTO: 0.69 10*3/MM3 (ref 0–0.9)
MONOCYTES NFR BLD AUTO: 8.5 % (ref 0–12)
NEUTROPHILS # BLD AUTO: 4.43 10*3/MM3 (ref 2–8.6)
NEUTROPHILS NFR BLD AUTO: 54.2 % (ref 37–80)
NRBC BLD MANUAL-RTO: 0 /100 WBC (ref 0–0)
PLATELET # BLD AUTO: 211 10*3/MM3 (ref 150–450)
PMV BLD AUTO: 10.2 FL (ref 8–12)
POTASSIUM BLD-SCNC: 3.6 MMOL/L (ref 3.5–5.1)
RBC # BLD AUTO: 3.74 10*6/MM3 (ref 3.77–5.16)
SODIUM BLD-SCNC: 143 MMOL/L (ref 137–145)
STRESS TARGET HR: 145 BPM
WBC NRBC COR # BLD: 8.16 10*3/MM3 (ref 3.2–9.8)

## 2018-07-12 PROCEDURE — 93306 TTE W/DOPPLER COMPLETE: CPT | Performed by: INTERNAL MEDICINE

## 2018-07-12 PROCEDURE — 85025 COMPLETE CBC W/AUTO DIFF WBC: CPT | Performed by: INTERNAL MEDICINE

## 2018-07-12 PROCEDURE — 0 IOPAMIDOL PER 1 ML: Performed by: INTERNAL MEDICINE

## 2018-07-12 PROCEDURE — 96361 HYDRATE IV INFUSION ADD-ON: CPT

## 2018-07-12 PROCEDURE — G0378 HOSPITAL OBSERVATION PER HR: HCPCS

## 2018-07-12 PROCEDURE — 80048 BASIC METABOLIC PNL TOTAL CA: CPT | Performed by: INTERNAL MEDICINE

## 2018-07-12 PROCEDURE — 82962 GLUCOSE BLOOD TEST: CPT

## 2018-07-12 PROCEDURE — 25010000002 DIPHENHYDRAMINE PER 50 MG: Performed by: INTERNAL MEDICINE

## 2018-07-12 PROCEDURE — 96375 TX/PRO/DX INJ NEW DRUG ADDON: CPT

## 2018-07-12 PROCEDURE — 93306 TTE W/DOPPLER COMPLETE: CPT

## 2018-07-12 PROCEDURE — 63710000001 INSULIN ASPART PER 5 UNITS: Performed by: INTERNAL MEDICINE

## 2018-07-12 PROCEDURE — 75574 CT ANGIO HRT W/3D IMAGE: CPT

## 2018-07-12 RX ORDER — DIPHENHYDRAMINE HYDROCHLORIDE 50 MG/ML
25 INJECTION INTRAMUSCULAR; INTRAVENOUS ONCE AS NEEDED
Status: COMPLETED | OUTPATIENT
Start: 2018-07-12 | End: 2018-07-12

## 2018-07-12 RX ORDER — LITHIUM CARBONATE 300 MG
600 TABLET ORAL 2 TIMES DAILY WITH MEALS
Status: DISCONTINUED | OUTPATIENT
Start: 2018-07-12 | End: 2018-07-12 | Stop reason: HOSPADM

## 2018-07-12 RX ORDER — LOSARTAN POTASSIUM 25 MG/1
25 TABLET ORAL
Qty: 30 TABLET | Refills: 0 | Status: SHIPPED | OUTPATIENT
Start: 2018-07-13 | End: 2022-08-15 | Stop reason: CLARIF

## 2018-07-12 RX ADMIN — DIPHENHYDRAMINE HYDROCHLORIDE 25 MG: 50 INJECTION INTRAMUSCULAR; INTRAVENOUS at 13:03

## 2018-07-12 RX ADMIN — PROPRANOLOL HYDROCHLORIDE 10 MG: 10 TABLET ORAL at 08:29

## 2018-07-12 RX ADMIN — IOPAMIDOL 89 ML: 755 INJECTION, SOLUTION INTRAVENOUS at 13:20

## 2018-07-12 RX ADMIN — ASPIRIN 81 MG: 81 TABLET, COATED ORAL at 08:29

## 2018-07-12 RX ADMIN — LEVOTHYROXINE SODIUM 200 MCG: 100 TABLET ORAL at 08:28

## 2018-07-12 RX ADMIN — INSULIN ASPART 2 UNITS: 100 INJECTION, SOLUTION INTRAVENOUS; SUBCUTANEOUS at 11:13

## 2018-07-12 RX ADMIN — SODIUM CHLORIDE 75 ML/HR: 9 INJECTION, SOLUTION INTRAVENOUS at 05:50

## 2018-07-12 RX ADMIN — LOSARTAN POTASSIUM 25 MG: 25 TABLET ORAL at 08:29

## 2018-07-12 RX ADMIN — LITHIUM CARBONATE 600 MG: 300 TABLET ORAL at 08:29

## 2018-07-12 NOTE — DISCHARGE SUMMARY
[facility] Medicine Services  DISCHARGE SUMMARY       Date of Admission: 7/11/2018  Date of Discharge:  7/12/2018  Primary Care Physician: Harmeet Ríos MD    Presenting Problem/History of Present Illness:  Chest pain [R07.9]     Final Discharge Diagnoses:  Principal Problem:    Chest pain  Active Problems:    HTN (hypertension)    Bipolar disorder (CMS/HCC)      Consults:   Consults     Date and Time Order Name Status Description    7/11/2018 1557 Inpatient Cardiology Consult Completed         Pertinent Test Results:   Ct Angiogram Coronary    Result Date: 7/12/2018  Procedure: CT angiogram coronary with contrast CLINICAL HISTORY: Chest pain, chronic. COMPARISON: None. Post processing was performed by the radiologist at the [employer] workstation. Serial axial CT images were obtained through the heart at 3 mm thickness without contrast for calcium scoring. 3D images including vessel probing technique were also obtained. Subsequently, following the intravenous administration of 89 ml of Isovue 370, serial axial CT images were obtained through the heart at 0.6 mm thickness utilizing retrospective gating. This exam was performed according to our departmental dose optimization program, which includes automated exposure control, adjustment of the mA and/or KV according to patient size and/or use of iterative reconstruction technique. Full field of view reconstructed images were used for evaluation of the extracardiac tissues. CALCIUM PLAQUE BURDEN: REGION                                         CALCIUM SCORE ([place]) Left Main                                                      0 Right Coronary Artery                                 0 Left Anterior Descending                            0 Circumflex                                                    0 Posterior Descending Artery                       0      Your Calcium Score is 0  This places the patent in the very low,  generally less than 5% risk for coronary artery disease. CTA OF THE CORONARY ARTERIES: There is good visualization of the left main, LAD, diagonals, circumflex, and RCA. The patient is right dominant. Left Main: No calcified or soft tissue density plaque and no stenosis. LAD: No calcified or soft tissue density plaque and no stenosis. Circumflex: No calcified or soft tissue density plaque and no stenosis. RCA: No calcified or soft tissue density plaque and no stenosis. The aortic valve is tricuspid. There is no myocardial bridging. On short axis views, the myocardium is homogeneous in thickness. EXTRACARDIAC SOFT TISSUES: Mediastinum: On the imaging, the ascending and descending aorta are normal in caliber. There is no mediastinal or hilar lymphadenopathy. The pericardium is normal. Lungs: No consolidation or focal nodules are present. There is no pneumothorax or effusion. The pulmonary arteries are normal in appearance. Abdomen: No evidence of hiatal hernia. The imaged liver and spleen are unremarkable. There is no lymphadenopathy in the upper abdomen. Bones: There are no lytic or blastic lesions within the osseous structures. CT FUNCTIONAL ANALYSIS: Ejection Fraction     73 % Diastolic Volume     112 ml Systolic Volume      30 ml Stroke Volume        82 ml Cardiac Output       4.8 L/minute     CONCLUSION: Normal study. Electronically signed by:  Napoleon Davies MD  7/12/2018 3:28 PM CDT Workstation: [id number]      HPI/Hospital Course:  The patient is a 52 y.o. female with a history of DM, obesity, and bipolar disorder  who presented to [facility] with chest pain.  Cardiac enzymes were negative.  She ruled out for MI.  She was evaluated by cardiology who recommended CTA coronary and echocardiogram. Echo revealed EF 61-65%, grade I diastolic dysfunction.  CTA coronary was negative for obstructive disease with a calcium score of 0.  She has been hypertensive and will be discharged on losartan. She is stable  "and discharged to home.      Condition on Discharge:  Stable    Physical Exam on Discharge:  BP (!) 186/91 (BP Location: Right arm, Patient Position: Lying)   Pulse 63   Temp 99.5 °F (37.5 °C) (Temporal Artery )   Resp 18   Ht 154.9 cm (61\")   Wt 87.8 kg (193 lb 9.6 oz)   SpO2 97%   BMI 36.58 kg/m²   Physical Exam   Vitals reviewed.  Please see today's progress note for full physical examination.     Discharge Disposition:  Home or Self Care    Discharge Medications:     Discharge Medications      New Medications      Instructions Start Date   losartan 25 MG tablet  Commonly known as:  COZAAR   25 mg, Oral, Every 24 Hours Scheduled   Start Date:  7/13/2018        Continue These Medications      Instructions Start Date   hydrOXYzine 25 MG capsule  Commonly known as:  VISTARIL   25 mg, Oral, 3 Times Daily PRN      lithium 600 MG capsule   600 mg, Oral, 2 Times Daily With Meals      propranolol 10 MG tablet  Commonly known as:  INDERAL   10 mg, Oral, 2 Times Daily PRN      SEROQUEL  MG 24 hr tablet  Generic drug:  QUEtiapine XR   800 mg, Oral, Nightly      SYNTHROID 200 MCG tablet  Generic drug:  levothyroxine   200 mcg, Oral, Daily             Discharge Diet:   Diet Instructions     Diet: Regular, Consistent Carbohydrate; Thin       Discharge Diet:   Regular  Consistent Carbohydrate       Fluid Consistency:  Thin          Activity at Discharge:   Activity Instructions     Activity as Tolerated               Follow-up Appointments:  1. PCP 1 week    LUIS EDUARDO Monk  07/12/18  3:53 PM                     "

## 2018-07-12 NOTE — PLAN OF CARE
Problem: Patient Care Overview  Goal: Plan of Care Review  Outcome: Ongoing (interventions implemented as appropriate)   07/12/18 9574   Coping/Psychosocial   Plan of Care Reviewed With patient   Plan of Care Review   Progress no change   OTHER   Outcome Summary Pt is resting well at this time. VS stable. Pt is still having slight chest pain but she said that it has improved and is not affecting her arm when she was at home. Will continue to monitor

## 2018-07-12 NOTE — PROGRESS NOTES
[facility] Medicine Services  INPATIENT PROGRESS NOTE    Length of Stay: 0  Date of Admission: 7/11/2018  Primary Care Physician: Harmeet Ríos MD    Subjective   Chief Complaint: Chest pain  HPI:  52 year old female with a history of DM, obesity, and bipolar disorder who was transferred from [facility] for chest pain.  She ruled out for MI.  Cardiology consulted and recommended CTA coronary and echocardiogram.     Review of Systems   Constitutional: Negative for chills and fever.   Respiratory: Negative for shortness of breath.    Cardiovascular: Negative for chest pain.   Gastrointestinal: Negative for abdominal pain, diarrhea, nausea and vomiting.      All pertinent negatives and positives are as above. All other systems have been reviewed and are negative unless otherwise stated.     Objective    Temp:  [96.7 °F (35.9 °C)-99.5 °F (37.5 °C)] 99.5 °F (37.5 °C)  Heart Rate:  [55-80] 63  Resp:  [16-22] 18  BP: (113-188)/(62-91) 186/91    Physical Exam   Constitutional: She is oriented to person, place, and time. She appears well-developed and well-nourished.   HENT:   Head: Normocephalic.   Eyes: Conjunctivae are normal.   Cardiovascular: Normal rate, regular rhythm, normal heart sounds and intact distal pulses.    Pulmonary/Chest: Effort normal and breath sounds normal.   Abdominal: Soft. Bowel sounds are normal. She exhibits no distension. There is no tenderness.   Musculoskeletal: Normal range of motion. She exhibits no edema.   Neurological: She is alert and oriented to person, place, and time.   Skin: Skin is warm and dry. She is not diaphoretic. No erythema.   Vitals reviewed.    Results Review:  I have reviewed the labs, radiology results, and diagnostic studies.    Laboratory Data:     Results from last 7 days  Lab Units 07/12/18  0522 07/11/18  1611   SODIUM mmol/L 143 143   POTASSIUM mmol/L 3.6 3.8   CHLORIDE mmol/L 114* 111*   CO2 mmol/L 21.0*  20.0*   BUN mg/dL 10 12   CREATININE mg/dL 0.71 0.71   GLUCOSE mg/dL 135* 162*   CALCIUM mg/dL 8.9 9.9   BILIRUBIN mg/dL  --  0.7   ALK PHOS U/L  --  78   ALT (SGPT) U/L  --  69*   AST (SGOT) U/L  --  37*   ANION GAP mmol/L 8.0 12.0     Estimated Creatinine Clearance: 95.5 mL/min (by C-G formula based on SCr of 0.71 mg/dL).            Results from last 7 days  Lab Units 07/12/18  0522 07/11/18  1611   WBC 10*3/mm3 8.16 7.25   HEMOGLOBIN g/dL 11.8* 13.2   HEMATOCRIT % 33.8* 36.6   PLATELETS 10*3/mm3 211 215       Results from last 7 days  Lab Units 07/11/18  1611   INR  1.09       Culture Data:   No results found for: BLOODCX  No results found for: URINECX  No results found for: RESPCX  No results found for: WOUNDCX  No results found for: STOOLCX  No components found for: BODYFLD    Radiology Data:   Imaging Results (last 24 hours)     Procedure Component Value Units Date/Time    CT Angiogram Coronary [[id number]] Updated:  07/12/18 1345          I have reviewed the patient's current medications.     Assessment/Plan     Hospital Problem List     * (Principal)Chest pain    HTN (hypertension)    Bipolar disorder (CMS/[facility])          Plan:    CTA coronary pending  Echo: EF 61-65%, grade I diastolic dysfunction  ASA  Propranolol, cozaar  Continue home psychiatric medications          This document has been electronically signed by LUIS EDUARDO Monk on July 12, 2018 2:59 PM

## 2019-02-04 ENCOUNTER — TELEPHONE (OUTPATIENT)
Dept: ENDOCRINOLOGY | Facility: CLINIC | Age: 54
End: 2019-02-04

## 2019-02-21 ENCOUNTER — OFFICE VISIT (OUTPATIENT)
Dept: ENDOCRINOLOGY | Facility: CLINIC | Age: 54
End: 2019-02-21

## 2019-02-21 DIAGNOSIS — E11.65 TYPE 2 DIABETES MELLITUS WITH HYPERGLYCEMIA, WITHOUT LONG-TERM CURRENT USE OF INSULIN (HCC): Primary | ICD-10-CM

## 2019-02-21 PROBLEM — E11.9 TYPE 2 DIABETES MELLITUS, WITHOUT LONG-TERM CURRENT USE OF INSULIN (HCC): Status: ACTIVE | Noted: 2019-02-21

## 2019-02-21 NOTE — PROGRESS NOTES
"Yahaira Gurrola is a 53 y.o. female referred by Dr. Strong for outpatient diabetes education. Patient attended class number 1 on 02/21/2019. Patient's height is 5'1\" and weight is 198 lbs. Most recent blood glucose was 189 mg/dl. Topics covered in class number one included:    1. Carbohydrate Counting/ Exchange Choices and Healthy Foods   i.  Reviewed carbohydrate-containing foods, standard serving sizes, and measuring foods.   ii. Provided patient with detailed carbohydrate counting guide. (Carb counting and meal planning book)   iii. Provided patient with list of non-starchy vegetables and foods that are low in carbohydrate for snacks and to incorporate with meals.   iv. Instructed patient to eat 45-60 grams of carbohydrate with each meal (3-4 exchange choices) and 15-30 grams of carbohydrate for snacks (1-2 exchange choices).   v. Reviewed the difference between simple and complex carbohydrate. Encouraged patient to choose complex carbohydrates more often.    vi. Choose fruits, vegetables, whole grains, legumes, low-fat milk, fiber-rich foods, minimal saturated fats, and watch cholesterol and sodium intake.     2. Pathophysiology of Diabetes   i. Reviewed disease process of type 2 diabetes. Explained pre-diabetes and diabetes.    ii. ABCs : A1C, Blood pressure, Cholesterol    iii. Reviewed common conditions associated with uncontrolled diabetes (diabetic neuropathy, retinopathy, nephropathy, heart disease, skin infections, and foot problems)      3. Hyperglycemia   i. Reviewed the signs and symptoms of high blood sugar.    ii. Explained A1C and the average blood sugar that goes along with the A1C level.      3. Hypoglycemia   i. Reviewed the signs and symptoms of low blood sugar.   ii. Explained a low blood sugar is a blood glucose reading below 70 mg/dL.   iii. Provided patient with handout detailing proper treatment guidelines.    4. Checking Blood Sugar   i. Explained when checking blood sugar to check " different times of the day to see if there is a pattern. For example: Check fasting blood glucose in the morning, check before lunch, check 2 hours after a meal, and at bedtime. Reminded to check blood sugar if ever feels jittery to know if blood sugar is high or low.     5. Dining Out with Diabetes   i. Provided patient with Nutrition in the [employer] book, which will allow patient to look up fast food menu items and the sodium,calories, carbohydrate, and fat contents of these foods.   ii. Discussed healthier options while dining out. Be moderate with dressings and sauces, choose grilled/baked options over fried foods, choose high-carb foods such as pasta or rice as a side dish instead of main meal, etc.    iii. Patient viewed fat and sodium tubes during this class.    iv. Healthy cooking tips, dining out with diabetes, and grocery shopping tip-sheets provided.     6. Sodium Content of Foods   i. Reduce sodium intake.    ii. Discussed the affects of sodium on blood pressure.    iii. Reviewed common foods that are high in sodium such as processed meats and cheeses, crackers and chips, canned soups and vegetables, sauces, etc.   iv. Provided patient with list of alternative seasonings, advised to avoid No Salt substitute as it is high in potassium.    7. Fats   i. Reviewed the difference between unsaturated and saturated fats.   ii. Limit trans fats.    iii. Discussed the affects of fats on cholesterol and triglyceride levels.   iv. Patient provided with handouts.    Patient will be returning for class number 2 next week.     Thank you Dr. Strong  for this referral.     Franchesca Isaac, ROBN, RN  Diabetes Educator

## 2019-06-09 ENCOUNTER — HOSPITAL ENCOUNTER (EMERGENCY)
Facility: HOSPITAL | Age: 54
Discharge: HOME | End: 2019-06-09
Attending: EMERGENCY MEDICINE | Admitting: EMERGENCY MEDICINE

## 2019-06-09 ENCOUNTER — APPOINTMENT (OUTPATIENT)
Dept: GENERAL RADIOLOGY | Facility: HOSPITAL | Age: 54
End: 2019-06-09

## 2019-06-09 VITALS
HEART RATE: 56 BPM | WEIGHT: 177 LBS | DIASTOLIC BLOOD PRESSURE: 81 MMHG | RESPIRATION RATE: 18 BRPM | BODY MASS INDEX: 33.42 KG/M2 | OXYGEN SATURATION: 96 % | TEMPERATURE: 98 F | SYSTOLIC BLOOD PRESSURE: 169 MMHG | HEIGHT: 61 IN

## 2019-06-09 DIAGNOSIS — R10.30 LOWER ABDOMINAL PAIN: ICD-10-CM

## 2019-06-09 DIAGNOSIS — K52.9 ENTERITIS: Primary | ICD-10-CM

## 2019-06-09 LAB
ALBUMIN SERPL-MCNC: 4.6 G/DL (ref 3.5–5.2)
ALBUMIN/GLOB SERPL: 1.8 G/DL
ALP SERPL-CCNC: 69 U/L (ref 39–117)
ALT SERPL W P-5'-P-CCNC: 50 U/L (ref 1–33)
ANION GAP SERPL CALCULATED.3IONS-SCNC: 13 MMOL/L
AST SERPL-CCNC: 26 U/L (ref 1–32)
BACTERIA UR QL AUTO: ABNORMAL /HPF
BASOPHILS # BLD AUTO: 0.06 10*3/MM3 (ref 0–0.2)
BASOPHILS NFR BLD AUTO: 0.7 % (ref 0–1.5)
BILIRUB SERPL-MCNC: 0.6 MG/DL (ref 0.2–1.2)
BILIRUB UR QL STRIP: NEGATIVE
BUN BLD-MCNC: 6 MG/DL (ref 6–20)
BUN/CREAT SERPL: 7.5 (ref 7–25)
CALCIUM SPEC-SCNC: 9.9 MG/DL (ref 8.6–10.5)
CHLORIDE SERPL-SCNC: 104 MMOL/L (ref 98–107)
CLARITY UR: CLEAR
CO2 SERPL-SCNC: 25 MMOL/L (ref 22–29)
COLOR UR: YELLOW
CREAT BLD-MCNC: 0.8 MG/DL (ref 0.57–1)
DEPRECATED RDW RBC AUTO: 40.1 FL (ref 37–54)
EOSINOPHIL # BLD AUTO: 0.22 10*3/MM3 (ref 0–0.4)
EOSINOPHIL NFR BLD AUTO: 2.5 % (ref 0.3–6.2)
ERYTHROCYTE [DISTWIDTH] IN BLOOD BY AUTOMATED COUNT: 12.5 % (ref 12.3–15.4)
GFR SERPL CREATININE-BSD FRML MDRD: 76 ML/MIN/1.73
GLOBULIN UR ELPH-MCNC: 2.6 GM/DL
GLUCOSE BLD-MCNC: 145 MG/DL (ref 65–99)
GLUCOSE UR STRIP-MCNC: ABNORMAL MG/DL
HCT VFR BLD AUTO: 42.2 % (ref 34–46.6)
HGB BLD-MCNC: 14.7 G/DL (ref 12–15.9)
HGB UR QL STRIP.AUTO: ABNORMAL
HOLD SPECIMEN: NORMAL
HOLD SPECIMEN: NORMAL
HYALINE CASTS UR QL AUTO: ABNORMAL /LPF
IMM GRANULOCYTES # BLD AUTO: 0.03 10*3/MM3 (ref 0–0.05)
IMM GRANULOCYTES NFR BLD AUTO: 0.3 % (ref 0–0.5)
KETONES UR QL STRIP: NEGATIVE
LEUKOCYTE ESTERASE UR QL STRIP.AUTO: ABNORMAL
LIPASE SERPL-CCNC: 46 U/L (ref 13–60)
LITHIUM SERPL-SCNC: 1 MMOL/L (ref 0.6–1.2)
LYMPHOCYTES # BLD AUTO: 2.25 10*3/MM3 (ref 0.7–3.1)
LYMPHOCYTES NFR BLD AUTO: 25.5 % (ref 19.6–45.3)
MCH RBC QN AUTO: 30.6 PG (ref 26.6–33)
MCHC RBC AUTO-ENTMCNC: 34.8 G/DL (ref 31.5–35.7)
MCV RBC AUTO: 87.9 FL (ref 79–97)
MONOCYTES # BLD AUTO: 0.63 10*3/MM3 (ref 0.1–0.9)
MONOCYTES NFR BLD AUTO: 7.1 % (ref 5–12)
NEUTROPHILS # BLD AUTO: 5.65 10*3/MM3 (ref 1.7–7)
NEUTROPHILS NFR BLD AUTO: 63.9 % (ref 42.7–76)
NITRITE UR QL STRIP: NEGATIVE
NRBC BLD AUTO-RTO: 0 /100 WBC (ref 0–0.2)
PH UR STRIP.AUTO: 7 [PH] (ref 5–9)
PLATELET # BLD AUTO: 238 10*3/MM3 (ref 140–450)
PMV BLD AUTO: 10.5 FL (ref 6–12)
POTASSIUM BLD-SCNC: 3.7 MMOL/L (ref 3.5–5.2)
PROT SERPL-MCNC: 7.2 G/DL (ref 6–8.5)
PROT UR QL STRIP: ABNORMAL
RBC # BLD AUTO: 4.8 10*6/MM3 (ref 3.77–5.28)
RBC # UR: ABNORMAL /HPF
REF LAB TEST METHOD: ABNORMAL
SODIUM BLD-SCNC: 142 MMOL/L (ref 136–145)
SP GR UR STRIP: 1.01 (ref 1–1.03)
SQUAMOUS #/AREA URNS HPF: ABNORMAL /HPF
UROBILINOGEN UR QL STRIP: ABNORMAL
WBC NRBC COR # BLD: 8.84 10*3/MM3 (ref 3.4–10.8)
WBC UR QL AUTO: ABNORMAL /HPF
WHOLE BLOOD HOLD SPECIMEN: NORMAL
WHOLE BLOOD HOLD SPECIMEN: NORMAL

## 2019-06-09 PROCEDURE — 74019 RADEX ABDOMEN 2 VIEWS: CPT

## 2019-06-09 PROCEDURE — 81001 URINALYSIS AUTO W/SCOPE: CPT | Performed by: EMERGENCY MEDICINE

## 2019-06-09 PROCEDURE — 83690 ASSAY OF LIPASE: CPT | Performed by: EMERGENCY MEDICINE

## 2019-06-09 PROCEDURE — 80178 ASSAY OF LITHIUM: CPT | Performed by: EMERGENCY MEDICINE

## 2019-06-09 PROCEDURE — 85025 COMPLETE CBC W/AUTO DIFF WBC: CPT | Performed by: EMERGENCY MEDICINE

## 2019-06-09 PROCEDURE — 80053 COMPREHEN METABOLIC PANEL: CPT | Performed by: EMERGENCY MEDICINE

## 2019-06-09 PROCEDURE — 99284 EMERGENCY DEPT VISIT MOD MDM: CPT

## 2019-06-09 RX ORDER — METRONIDAZOLE 500 MG/1
500 TABLET ORAL 2 TIMES DAILY
Qty: 14 TABLET | Refills: 0 | Status: SHIPPED | OUTPATIENT
Start: 2019-06-09 | End: 2022-07-18

## 2019-06-09 RX ORDER — SODIUM CHLORIDE 0.9 % (FLUSH) 0.9 %
10 SYRINGE (ML) INJECTION AS NEEDED
Status: DISCONTINUED | OUTPATIENT
Start: 2019-06-09 | End: 2019-06-09 | Stop reason: HOSPADM

## 2019-06-09 RX ORDER — METOCLOPRAMIDE 5 MG/1
5 TABLET ORAL 3 TIMES DAILY PRN
Qty: 21 TABLET | Refills: 0 | Status: SHIPPED | OUTPATIENT
Start: 2019-06-09 | End: 2022-07-18

## 2019-06-09 RX ORDER — METRONIDAZOLE 500 MG/1
500 TABLET ORAL ONCE
Status: COMPLETED | OUTPATIENT
Start: 2019-06-09 | End: 2019-06-09

## 2019-06-09 RX ORDER — METOCLOPRAMIDE 10 MG/1
10 TABLET ORAL
Status: DISCONTINUED | OUTPATIENT
Start: 2019-06-09 | End: 2019-06-09 | Stop reason: HOSPADM

## 2019-06-09 RX ADMIN — METRONIDAZOLE 500 MG: 500 TABLET ORAL at 17:14

## 2019-06-09 RX ADMIN — METOCLOPRAMIDE HYDROCHLORIDE 10 MG: 10 TABLET ORAL at 17:14

## 2019-06-09 NOTE — ED PROVIDER NOTES
Subjective   Patient is a 53-year-old female with history of constipation.  Patient states that she has not had a good bowel movement in 2 weeks.  This is somewhat clouded by the history that she gives as she has had bowel movements on Wednesday after taking some Linzess, complaining of repetitive bouts of diarrhea.  That was 3 days ago.  Patient states she has been taking the Linzess last 2 days and has had no response from it.  Patient denies any fevers.  Patient has had some vomiting today with nausea after taking her medication.  Patient notes some lower abdominal and right-sided abdominal pain.  This is been more of a cramping aching type pain per her account.  No dysuria.  Patient has a baseline constipation for which she takes Metamucil Colace and MiraLAX intermittently.            Review of Systems   Constitutional: Negative.  Negative for appetite change, chills and fever.   HENT: Negative.  Negative for congestion.    Eyes: Negative.  Negative for photophobia and visual disturbance.   Respiratory: Negative.  Negative for cough, chest tightness and shortness of breath.    Cardiovascular: Negative.  Negative for chest pain and palpitations.   Gastrointestinal: Positive for abdominal pain, nausea and vomiting. Negative for constipation and diarrhea.   Endocrine: Negative.    Genitourinary: Negative.  Negative for decreased urine volume, dysuria, flank pain and hematuria.   Musculoskeletal: Negative.  Negative for arthralgias, back pain, myalgias, neck pain and neck stiffness.   Skin: Negative.  Negative for pallor.   Neurological: Negative.  Negative for dizziness, syncope, weakness, light-headedness, numbness and headaches.   Psychiatric/Behavioral: Negative.  Negative for confusion and suicidal ideas. The patient is not nervous/anxious.    All other systems reviewed and are negative.      Past Medical History:   Diagnosis Date   • Bipolar 1 disorder (CMS/HCC)    • Disease of thyroid gland    • PONV  (postoperative nausea and vomiting)        Allergies   Allergen Reactions   • Geodon [Ziprasidone Hcl] Nausea And Vomiting   • Sulfa Antibiotics Nausea And Vomiting       Past Surgical History:   Procedure Laterality Date   • COLONOSCOPY     • COLONOSCOPY N/A 4/7/2017    Procedure: COLONOSCOPY;  Surgeon: James Simpson DO;  Location: [facility] ENDOSCOPY;  Service:    • HYSTERECTOMY         History reviewed. No pertinent family history.    Social History     Socioeconomic History   • Marital status:      Spouse name: Not on file   • Number of children: Not on file   • Years of education: Not on file   • Highest education level: Not on file   Tobacco Use   • Smoking status: Never Smoker   • Smokeless tobacco: Never Used   Substance and Sexual Activity   • Alcohol use: No   • Drug use: No   • Sexual activity: Defer           Objective   Physical Exam   Constitutional: She is oriented to person, place, and time. She appears well-developed and well-nourished.  Non-toxic appearance. She does not appear ill. No distress.   HENT:   Head: Normocephalic and atraumatic.   Nose: Nose normal.   Mouth/Throat: Oropharynx is clear and moist.   Eyes: Conjunctivae and EOM are normal. No scleral icterus.   Neck: Normal range of motion. Neck supple. No JVD present.   Cardiovascular: Normal rate, regular rhythm, normal heart sounds and intact distal pulses. Exam reveals no gallop and no friction rub.   No murmur heard.  Pulmonary/Chest: Effort normal. No respiratory distress. She has no wheezes. She has no rales. She exhibits no tenderness.   Abdominal: Soft. She exhibits no distension and no mass. There is tenderness in the right lower quadrant and suprapubic area. There is no rigidity, no rebound and no guarding.   Musculoskeletal: Normal range of motion. She exhibits no edema, tenderness or deformity.   Lymphadenopathy:     She has no cervical adenopathy.   Neurological: She is alert and oriented to person, place, and time. No  cranial nerve deficit. She exhibits normal muscle tone.   Skin: Skin is warm and dry. Capillary refill takes less than 2 seconds. No rash noted. She is not diaphoretic. No erythema. No pallor.   Psychiatric: She has a normal mood and affect. Her behavior is normal. Judgment and thought content normal.   Nursing note and vitals reviewed.      Procedures           ED Course      Labs Reviewed   COMPREHENSIVE METABOLIC PANEL - Abnormal; Notable for the following components:       Result Value    Glucose 145 (*)     ALT (SGPT) 50 (*)     All other components within normal limits    Narrative:     GFR Normal >60  Chronic Kidney Disease <60  Kidney Failure <15   URINALYSIS W/ MICROSCOPIC IF INDICATED (NO CULTURE) - Abnormal; Notable for the following components:    Glucose, UA >=1000 mg/dL (3+) (*)     Blood, UA Large (3+) (*)     Protein, UA Trace (*)     Leuk Esterase, UA Trace (*)     All other components within normal limits   URINALYSIS, MICROSCOPIC ONLY - Abnormal; Notable for the following components:    Bacteria, UA 1+ (*)     Squamous Epithelial Cells, UA 3-5 (*)     All other components within normal limits    Narrative:     INTERPRET WITH CAUTION:  ONLY ONE DROP OF URINE RECEIVED. MICROSCOPIC RESULTS WILL BE FALSELY LOW    LIPASE - Normal   CBC WITH AUTO DIFFERENTIAL - Normal   RAINBOW DRAW    Narrative:     The following orders were created for panel order [place] Draw.  Procedure                               Abnormality         Status                     ---------                               -----------         ------                     Light Blue Top[[id number]]                                   In process                 Green Top (Gel)[[id number]]                                  In process                 Lavender Top[[id number]]                                     In process                 Gold Top - SST[[id number]]                                   In process                   Please view results for  these tests on the individual orders.   LITHIUM LEVEL   CBC AND DIFFERENTIAL    Narrative:     The following orders were created for panel order CBC & Differential.  Procedure                               Abnormality         Status                     ---------                               -----------         ------                     CBC Auto Differential[[id number]]        Normal              Final result                 Please view results for these tests on the individual orders.   LIGHT BLUE TOP   GREEN TOP   LAVENDER TOP   GOLD TOP - SST       XR Abdomen Flat & Upright   Final Result   1. Non-obstructed bowel gas pattern. Tiny air-fluid levels within   the colon may reflect colitis/diarrheal illness.   2. Bilateral nephrolithiasis      If pain or symptoms persist beyond reasonable expectations, a CT   examination is suggested, as is deemed clinically appropriate.      Electronically signed by:  Aditi Augustin MD  6/9/2019 3:46 PM CDT   Workstation: [id number]        Patient with nonsurgical abdomen.  No significant markers of constipation on x-ray.  Possible colitis enteritis.  Due to the length of the patient's symptoms be treated with Flagyl.  PRN Reglan, low-dose with her use of Seroquel, for symptoms of mild ileus.          MDM      Final diagnoses:   Enteritis   Lower abdominal pain            Tao Marquez MD  06/09/19 [id number]

## 2019-06-09 NOTE — DISCHARGE INSTRUCTIONS
Followup with Dr. Strong for further management.  Return with any new or worsening symptoms, or any concerns.

## 2019-06-14 ENCOUNTER — HOSPITAL ENCOUNTER (EMERGENCY)
Facility: HOSPITAL | Age: 54
Discharge: HOME | End: 2019-06-14
Attending: FAMILY MEDICINE | Admitting: EMERGENCY MEDICINE

## 2019-06-14 ENCOUNTER — APPOINTMENT (OUTPATIENT)
Dept: CT IMAGING | Facility: HOSPITAL | Age: 54
End: 2019-06-14

## 2019-06-14 VITALS
OXYGEN SATURATION: 98 % | BODY MASS INDEX: 33.02 KG/M2 | RESPIRATION RATE: 16 BRPM | HEIGHT: 61 IN | DIASTOLIC BLOOD PRESSURE: 81 MMHG | WEIGHT: 174.9 LBS | SYSTOLIC BLOOD PRESSURE: 166 MMHG | TEMPERATURE: 98.1 F | HEART RATE: 72 BPM

## 2019-06-14 DIAGNOSIS — R33.9 URINARY RETENTION: Primary | ICD-10-CM

## 2019-06-14 LAB
ALBUMIN SERPL-MCNC: 4.4 G/DL (ref 3.5–5.2)
ALBUMIN/GLOB SERPL: 1.8 G/DL
ALP SERPL-CCNC: 67 U/L (ref 39–117)
ALT SERPL W P-5'-P-CCNC: 49 U/L (ref 1–33)
ANION GAP SERPL CALCULATED.3IONS-SCNC: 14 MMOL/L
AST SERPL-CCNC: 33 U/L (ref 1–32)
BASOPHILS # BLD AUTO: 0.06 10*3/MM3 (ref 0–0.2)
BASOPHILS NFR BLD AUTO: 0.7 % (ref 0–1.5)
BILIRUB SERPL-MCNC: 0.6 MG/DL (ref 0.2–1.2)
BILIRUB UR QL STRIP: NEGATIVE
BUN BLD-MCNC: 5 MG/DL (ref 6–20)
BUN/CREAT SERPL: 6.8 (ref 7–25)
CALCIUM SPEC-SCNC: 10 MG/DL (ref 8.6–10.5)
CHLORIDE SERPL-SCNC: 105 MMOL/L (ref 98–107)
CLARITY UR: CLEAR
CO2 SERPL-SCNC: 22 MMOL/L (ref 22–29)
COLOR UR: YELLOW
CREAT BLD-MCNC: 0.73 MG/DL (ref 0.57–1)
DEPRECATED RDW RBC AUTO: 39.3 FL (ref 37–54)
EOSINOPHIL # BLD AUTO: 0.17 10*3/MM3 (ref 0–0.4)
EOSINOPHIL NFR BLD AUTO: 1.9 % (ref 0.3–6.2)
ERYTHROCYTE [DISTWIDTH] IN BLOOD BY AUTOMATED COUNT: 12.3 % (ref 12.3–15.4)
GFR SERPL CREATININE-BSD FRML MDRD: 84 ML/MIN/1.73
GLOBULIN UR ELPH-MCNC: 2.4 GM/DL
GLUCOSE BLD-MCNC: 129 MG/DL (ref 65–99)
GLUCOSE UR STRIP-MCNC: ABNORMAL MG/DL
HCT VFR BLD AUTO: 40.1 % (ref 34–46.6)
HGB BLD-MCNC: 14.2 G/DL (ref 12–15.9)
HGB UR QL STRIP.AUTO: NEGATIVE
IMM GRANULOCYTES # BLD AUTO: 0.02 10*3/MM3 (ref 0–0.05)
IMM GRANULOCYTES NFR BLD AUTO: 0.2 % (ref 0–0.5)
KETONES UR QL STRIP: ABNORMAL
LEUKOCYTE ESTERASE UR QL STRIP.AUTO: NEGATIVE
LIPASE SERPL-CCNC: 68 U/L (ref 13–60)
LYMPHOCYTES # BLD AUTO: 2.46 10*3/MM3 (ref 0.7–3.1)
LYMPHOCYTES NFR BLD AUTO: 27.8 % (ref 19.6–45.3)
MCH RBC QN AUTO: 30.8 PG (ref 26.6–33)
MCHC RBC AUTO-ENTMCNC: 35.4 G/DL (ref 31.5–35.7)
MCV RBC AUTO: 87 FL (ref 79–97)
MONOCYTES # BLD AUTO: 0.73 10*3/MM3 (ref 0.1–0.9)
MONOCYTES NFR BLD AUTO: 8.3 % (ref 5–12)
NEUTROPHILS # BLD AUTO: 5.4 10*3/MM3 (ref 1.7–7)
NEUTROPHILS NFR BLD AUTO: 61.1 % (ref 42.7–76)
NITRITE UR QL STRIP: NEGATIVE
NRBC BLD AUTO-RTO: 0 /100 WBC (ref 0–0.2)
PH UR STRIP.AUTO: 7.5 [PH] (ref 5–9)
PLATELET # BLD AUTO: 232 10*3/MM3 (ref 140–450)
PMV BLD AUTO: 10.3 FL (ref 6–12)
POTASSIUM BLD-SCNC: 3.9 MMOL/L (ref 3.5–5.2)
PROT SERPL-MCNC: 6.8 G/DL (ref 6–8.5)
PROT UR QL STRIP: NEGATIVE
RBC # BLD AUTO: 4.61 10*6/MM3 (ref 3.77–5.28)
SODIUM BLD-SCNC: 141 MMOL/L (ref 136–145)
SP GR UR STRIP: 1.01 (ref 1–1.03)
UROBILINOGEN UR QL STRIP: ABNORMAL
WBC NRBC COR # BLD: 8.84 10*3/MM3 (ref 3.4–10.8)

## 2019-06-14 PROCEDURE — 96361 HYDRATE IV INFUSION ADD-ON: CPT

## 2019-06-14 PROCEDURE — 80053 COMPREHEN METABOLIC PANEL: CPT | Performed by: PHYSICIAN ASSISTANT

## 2019-06-14 PROCEDURE — 96360 HYDRATION IV INFUSION INIT: CPT

## 2019-06-14 PROCEDURE — 85025 COMPLETE CBC W/AUTO DIFF WBC: CPT | Performed by: PHYSICIAN ASSISTANT

## 2019-06-14 PROCEDURE — 51798 US URINE CAPACITY MEASURE: CPT

## 2019-06-14 PROCEDURE — 83690 ASSAY OF LIPASE: CPT | Performed by: PHYSICIAN ASSISTANT

## 2019-06-14 PROCEDURE — 74177 CT ABD & PELVIS W/CONTRAST: CPT

## 2019-06-14 PROCEDURE — 99283 EMERGENCY DEPT VISIT LOW MDM: CPT

## 2019-06-14 PROCEDURE — 25010000002 IOPAMIDOL 61 % SOLUTION: Performed by: EMERGENCY MEDICINE

## 2019-06-14 PROCEDURE — 81003 URINALYSIS AUTO W/O SCOPE: CPT | Performed by: PHYSICIAN ASSISTANT

## 2019-06-14 RX ADMIN — SODIUM CHLORIDE 1000 ML: 900 INJECTION, SOLUTION INTRAVENOUS at 18:54

## 2019-06-14 RX ADMIN — IOPAMIDOL 90 ML: 612 INJECTION, SOLUTION INTRAVENOUS at 19:47

## 2019-06-15 ENCOUNTER — HOSPITAL ENCOUNTER (EMERGENCY)
Facility: HOSPITAL | Age: 54
Discharge: HOME | End: 2019-06-15
Attending: FAMILY MEDICINE | Admitting: FAMILY MEDICINE

## 2019-06-15 VITALS
SYSTOLIC BLOOD PRESSURE: 171 MMHG | BODY MASS INDEX: 33.04 KG/M2 | TEMPERATURE: 98.4 F | OXYGEN SATURATION: 98 % | HEIGHT: 61 IN | WEIGHT: 175 LBS | DIASTOLIC BLOOD PRESSURE: 72 MMHG | HEART RATE: 70 BPM | RESPIRATION RATE: 16 BRPM

## 2019-06-15 DIAGNOSIS — N39.0 URINARY TRACT INFECTION WITHOUT HEMATURIA, SITE UNSPECIFIED: ICD-10-CM

## 2019-06-15 DIAGNOSIS — R33.9 URINARY RETENTION: Primary | ICD-10-CM

## 2019-06-15 LAB
BACTERIA UR QL AUTO: ABNORMAL /HPF
BILIRUB UR QL STRIP: NEGATIVE
CLARITY UR: CLEAR
COLOR UR: YELLOW
GLUCOSE UR STRIP-MCNC: ABNORMAL MG/DL
HGB UR QL STRIP.AUTO: ABNORMAL
HYALINE CASTS UR QL AUTO: ABNORMAL /LPF
KETONES UR QL STRIP: NEGATIVE
LEUKOCYTE ESTERASE UR QL STRIP.AUTO: NEGATIVE
NITRITE UR QL STRIP: NEGATIVE
PH UR STRIP.AUTO: 7 [PH] (ref 5–9)
PROT UR QL STRIP: NEGATIVE
RBC # UR: ABNORMAL /HPF
REF LAB TEST METHOD: ABNORMAL
SP GR UR STRIP: 1.01 (ref 1–1.03)
SQUAMOUS #/AREA URNS HPF: ABNORMAL /HPF
UROBILINOGEN UR QL STRIP: ABNORMAL
WBC UR QL AUTO: ABNORMAL /HPF

## 2019-06-15 PROCEDURE — 99284 EMERGENCY DEPT VISIT MOD MDM: CPT

## 2019-06-15 PROCEDURE — 51702 INSERT TEMP BLADDER CATH: CPT

## 2019-06-15 PROCEDURE — 51798 US URINE CAPACITY MEASURE: CPT

## 2019-06-15 PROCEDURE — 96360 HYDRATION IV INFUSION INIT: CPT

## 2019-06-15 PROCEDURE — 81001 URINALYSIS AUTO W/SCOPE: CPT | Performed by: PHYSICIAN ASSISTANT

## 2019-06-15 RX ORDER — NITROFURANTOIN 25; 75 MG/1; MG/1
100 CAPSULE ORAL 2 TIMES DAILY
Qty: 14 CAPSULE | Refills: 0 | Status: SHIPPED | OUTPATIENT
Start: 2019-06-15 | End: 2019-06-22

## 2019-06-15 RX ADMIN — SODIUM CHLORIDE 1000 ML: 9 INJECTION, SOLUTION INTRAVENOUS at 15:33

## 2019-06-26 ENCOUNTER — HOSPITAL ENCOUNTER (EMERGENCY)
Facility: HOSPITAL | Age: 54
Discharge: PSYCHIATRIC OR SUBSTANCE USE FACILITY | End: 2019-06-27
Attending: EMERGENCY MEDICINE | Admitting: EMERGENCY MEDICINE

## 2019-06-26 DIAGNOSIS — T83.511A URINARY TRACT INFECTION ASSOCIATED WITH INDWELLING URETHRAL CATHETER, INITIAL ENCOUNTER (HCC): ICD-10-CM

## 2019-06-26 DIAGNOSIS — R45.851 DEPRESSION WITH SUICIDAL IDEATION: Primary | ICD-10-CM

## 2019-06-26 DIAGNOSIS — N39.0 URINARY TRACT INFECTION ASSOCIATED WITH INDWELLING URETHRAL CATHETER, INITIAL ENCOUNTER (HCC): ICD-10-CM

## 2019-06-26 DIAGNOSIS — F32.A DEPRESSION WITH SUICIDAL IDEATION: Primary | ICD-10-CM

## 2019-06-26 LAB
ALBUMIN SERPL-MCNC: 4.8 G/DL (ref 3.5–5.2)
ALBUMIN/GLOB SERPL: 1.7 G/DL
ALP SERPL-CCNC: 72 U/L (ref 39–117)
ALT SERPL W P-5'-P-CCNC: 52 U/L (ref 1–33)
AMPHET+METHAMPHET UR QL: NEGATIVE
ANION GAP SERPL CALCULATED.3IONS-SCNC: 14 MMOL/L (ref 5–15)
APAP SERPL-MCNC: <5 MCG/ML (ref 10–30)
AST SERPL-CCNC: 27 U/L (ref 1–32)
BACTERIA UR QL AUTO: ABNORMAL /HPF
BARBITURATES UR QL SCN: NEGATIVE
BASOPHILS # BLD AUTO: 0.09 10*3/MM3 (ref 0–0.2)
BASOPHILS NFR BLD AUTO: 0.7 % (ref 0–1.5)
BENZODIAZ UR QL SCN: NEGATIVE
BILIRUB SERPL-MCNC: 0.7 MG/DL (ref 0.2–1.2)
BILIRUB UR QL STRIP: NEGATIVE
BUN BLD-MCNC: 5 MG/DL (ref 6–20)
BUN/CREAT SERPL: 5.6 (ref 7–25)
CALCIUM SPEC-SCNC: 10.3 MG/DL (ref 8.6–10.5)
CANNABINOIDS SERPL QL: NEGATIVE
CHLORIDE SERPL-SCNC: 98 MMOL/L (ref 98–107)
CLARITY UR: ABNORMAL
CO2 SERPL-SCNC: 25 MMOL/L (ref 22–29)
COCAINE UR QL: NEGATIVE
COLOR UR: YELLOW
CREAT BLD-MCNC: 0.89 MG/DL (ref 0.57–1)
DEPRECATED RDW RBC AUTO: 40.8 FL (ref 37–54)
EOSINOPHIL # BLD AUTO: 0.38 10*3/MM3 (ref 0–0.4)
EOSINOPHIL NFR BLD AUTO: 3.1 % (ref 0.3–6.2)
ERYTHROCYTE [DISTWIDTH] IN BLOOD BY AUTOMATED COUNT: 12.6 % (ref 12.3–15.4)
ETHANOL BLD-MCNC: <10 MG/DL (ref 0–10)
ETHANOL UR QL: <0.01 %
GFR SERPL CREATININE-BSD FRML MDRD: 67 ML/MIN/1.73
GLOBULIN UR ELPH-MCNC: 2.9 GM/DL
GLUCOSE BLD-MCNC: 170 MG/DL (ref 65–99)
GLUCOSE UR STRIP-MCNC: ABNORMAL MG/DL
HCT VFR BLD AUTO: 43 % (ref 34–46.6)
HGB BLD-MCNC: 15.1 G/DL (ref 12–15.9)
HGB UR QL STRIP.AUTO: ABNORMAL
HOLD SPECIMEN: NORMAL
HYALINE CASTS UR QL AUTO: ABNORMAL /LPF
IMM GRANULOCYTES # BLD AUTO: 0.04 10*3/MM3 (ref 0–0.05)
IMM GRANULOCYTES NFR BLD AUTO: 0.3 % (ref 0–0.5)
KETONES UR QL STRIP: NEGATIVE
LEUKOCYTE ESTERASE UR QL STRIP.AUTO: ABNORMAL
LITHIUM SERPL-SCNC: 1.5 MMOL/L (ref 0.6–1.2)
LYMPHOCYTES # BLD AUTO: 2.33 10*3/MM3 (ref 0.7–3.1)
LYMPHOCYTES NFR BLD AUTO: 19.1 % (ref 19.6–45.3)
MCH RBC QN AUTO: 30.9 PG (ref 26.6–33)
MCHC RBC AUTO-ENTMCNC: 35.1 G/DL (ref 31.5–35.7)
MCV RBC AUTO: 87.9 FL (ref 79–97)
METHADONE UR QL SCN: NEGATIVE
MONOCYTES # BLD AUTO: 1.04 10*3/MM3 (ref 0.1–0.9)
MONOCYTES NFR BLD AUTO: 8.5 % (ref 5–12)
NEUTROPHILS # BLD AUTO: 8.31 10*3/MM3 (ref 1.7–7)
NEUTROPHILS NFR BLD AUTO: 68.3 % (ref 42.7–76)
NITRITE UR QL STRIP: NEGATIVE
NRBC BLD AUTO-RTO: 0 /100 WBC (ref 0–0.2)
OPIATES UR QL: NEGATIVE
OXYCODONE UR QL SCN: NEGATIVE
PH UR STRIP.AUTO: 7.5 [PH] (ref 5–9)
PLATELET # BLD AUTO: 288 10*3/MM3 (ref 140–450)
PMV BLD AUTO: 10.6 FL (ref 6–12)
POTASSIUM BLD-SCNC: 3.3 MMOL/L (ref 3.5–5.2)
POTASSIUM BLD-SCNC: 4.2 MMOL/L (ref 3.5–5.2)
PROT SERPL-MCNC: 7.7 G/DL (ref 6–8.5)
PROT UR QL STRIP: ABNORMAL
RBC # BLD AUTO: 4.89 10*6/MM3 (ref 3.77–5.28)
RBC # UR: ABNORMAL /HPF
REF LAB TEST METHOD: ABNORMAL
SALICYLATES SERPL-MCNC: <0.3 MG/DL
SODIUM BLD-SCNC: 137 MMOL/L (ref 136–145)
SP GR UR STRIP: 1.01 (ref 1–1.03)
SQUAMOUS #/AREA URNS HPF: ABNORMAL /HPF
UROBILINOGEN UR QL STRIP: ABNORMAL
WBC CLUMPS # UR AUTO: ABNORMAL /HPF
WBC NRBC COR # BLD: 12.19 10*3/MM3 (ref 3.4–10.8)
WBC UR QL AUTO: ABNORMAL /HPF
WHOLE BLOOD HOLD SPECIMEN: NORMAL
WHOLE BLOOD HOLD SPECIMEN: NORMAL

## 2019-06-26 PROCEDURE — 81001 URINALYSIS AUTO W/SCOPE: CPT | Performed by: EMERGENCY MEDICINE

## 2019-06-26 PROCEDURE — 93005 ELECTROCARDIOGRAM TRACING: CPT | Performed by: EMERGENCY MEDICINE

## 2019-06-26 PROCEDURE — 80307 DRUG TEST PRSMV CHEM ANLYZR: CPT | Performed by: EMERGENCY MEDICINE

## 2019-06-26 PROCEDURE — 85025 COMPLETE CBC W/AUTO DIFF WBC: CPT | Performed by: EMERGENCY MEDICINE

## 2019-06-26 PROCEDURE — 80178 ASSAY OF LITHIUM: CPT | Performed by: EMERGENCY MEDICINE

## 2019-06-26 PROCEDURE — 84132 ASSAY OF SERUM POTASSIUM: CPT | Performed by: EMERGENCY MEDICINE

## 2019-06-26 PROCEDURE — 93010 ELECTROCARDIOGRAM REPORT: CPT | Performed by: INTERNAL MEDICINE

## 2019-06-26 PROCEDURE — 80053 COMPREHEN METABOLIC PANEL: CPT | Performed by: EMERGENCY MEDICINE

## 2019-06-26 PROCEDURE — 99285 EMERGENCY DEPT VISIT HI MDM: CPT

## 2019-06-26 RX ORDER — CEPHALEXIN 500 MG/1
500 CAPSULE ORAL ONCE
Status: COMPLETED | OUTPATIENT
Start: 2019-06-26 | End: 2019-06-26

## 2019-06-26 RX ORDER — QUETIAPINE FUMARATE 300 MG/1
300 TABLET, FILM COATED ORAL ONCE
Status: COMPLETED | OUTPATIENT
Start: 2019-06-26 | End: 2019-06-26

## 2019-06-26 RX ORDER — HYDROXYZINE PAMOATE 25 MG/1
50 CAPSULE ORAL ONCE
Status: COMPLETED | OUTPATIENT
Start: 2019-06-26 | End: 2019-06-26

## 2019-06-26 RX ORDER — POTASSIUM CHLORIDE 1.5 G/1.77G
40 POWDER, FOR SOLUTION ORAL ONCE
Status: COMPLETED | OUTPATIENT
Start: 2019-06-26 | End: 2019-06-26

## 2019-06-26 RX ADMIN — HYDROXYZINE PAMOATE 25 MG: 25 CAPSULE ORAL at 15:30

## 2019-06-26 RX ADMIN — POTASSIUM CHLORIDE 40 MEQ: 1.5 POWDER, FOR SOLUTION ORAL at 19:17

## 2019-06-26 RX ADMIN — QUETIAPINE FUMARATE 300 MG: 300 TABLET ORAL at 20:27

## 2019-06-26 RX ADMIN — CEPHALEXIN 500 MG: 500 CAPSULE ORAL at 18:21

## 2019-06-27 VITALS
BODY MASS INDEX: 34.19 KG/M2 | WEIGHT: 181.1 LBS | HEART RATE: 75 BPM | OXYGEN SATURATION: 98 % | TEMPERATURE: 98.4 F | SYSTOLIC BLOOD PRESSURE: 141 MMHG | HEIGHT: 61 IN | DIASTOLIC BLOOD PRESSURE: 78 MMHG | RESPIRATION RATE: 14 BRPM

## 2020-02-27 ENCOUNTER — TRANSCRIBE ORDERS (OUTPATIENT)
Dept: NUTRITION | Facility: HOSPITAL | Age: 55
End: 2020-02-27

## 2020-02-27 DIAGNOSIS — E11.9 TYPE 2 DIABETES MELLITUS WITHOUT COMPLICATION, WITHOUT LONG-TERM CURRENT USE OF INSULIN (HCC): Primary | ICD-10-CM

## 2020-03-05 ENCOUNTER — HOSPITAL ENCOUNTER (OUTPATIENT)
Dept: NUTRITION | Facility: HOSPITAL | Age: 55
Discharge: HOME | End: 2020-03-05
Admitting: DIETITIAN, REGISTERED

## 2020-03-05 VITALS — HEIGHT: 61 IN | BODY MASS INDEX: 28.7 KG/M2 | WEIGHT: 152 LBS

## 2020-03-05 PROCEDURE — 97802 MEDICAL NUTRITION INDIV IN: CPT | Performed by: DIETITIAN, REGISTERED

## 2020-03-05 NOTE — PROGRESS NOTES
"Adult Outpatient Nutrition  Assessment    Patient Name:  Yahaira Gurrola  YOB: 1965  MRN: [id number]    Assessment Date:  3/5/2020    Comments:  Pt was referred by Claudia Strong MD for diabetes mgt. Pt was interested in talking to RDN to fine tune her nutrition intake/learn more about controlling her blood sugar and continuing to lose weight. Pt has lost from 203 lb to present weight of 152lb by changing her eating habits.Reviewed carb counting-goal of 45 grams per meal. Discussed label reading. Gave info so pt can have an increased variety of food to eat since she is tiring of the same foods. Pt will return in 2 weeks for follow up. This RDN will follow then. She has my phone number if questions arise.     General Info     Row Name 03/05/20 1208       Today's Session    Person(s) attending today's session  Patient       General Information    Lives With  spouse        Physical Findings     Row Name 03/05/20 1209          Physical Findings    Overall Physical Appearance  overweight         Anthropometrics     Row Name 03/05/20 1209          Anthropometrics    Height  154.9 cm (60.98\")     Weight  68.9 kg (152 lb)        Ideal Body Weight (IBW)    Ideal Body Weight (IBW) (kg)  48.11     % Ideal Body Weight  143.31        Body Mass Index (BMI)    BMI (kg/m2)  28.8         Nutritional Info/Activity     Row Name 03/05/20 1210       Nutritional Information    Have you had weight changes?  Yes    Describe weight changes  lost from 203lb since june 2019    What is your desired body weight?  54.4 kg (120 lb)    Have you tried to lose weight before?  Yes    List programs tried, date, and success  weight watchers    What is your motivation to lose weight?  control blood sugar, feel good about her health, family hx of dm.    Functional Status  able to prepare meals;able to purchase food;ambulatory    What is the biggest challenge you have with your diet?  Knowledge       Eating Environment    Eating " "environment  Family       Physical Activity    Are you currently involved in an activity/exercise program?   No plans to begin walking        Home Nutrition Report     Row Name 03/05/20 1212          Home Nutrition Report    Typical Food/Fluid Intake  pt eats three meals per day. brought in food diary for review.      Food Preferences  likes all foods         Estimated/Assessed Needs     Row Name 03/05/20 1212 03/05/20 1209       Calculation Measurements    Height  --  154.9 cm (60.98\")       Estimated/Assessed Needs    Additional Documentation  Calorie Requirements (Group)  --       Calorie Requirements    Estimated Calorie Requirement (kcal/day)  1400 for weight mgt  --                Electronically signed by:  Suzette Rausch, ALTAGRACIA  03/05/20 12:14 PM   "

## 2020-05-12 ENCOUNTER — DOCUMENTATION (OUTPATIENT)
Dept: NUTRITION | Facility: HOSPITAL | Age: 55
End: 2020-05-12

## 2020-05-12 NOTE — PROGRESS NOTES
Nutrition Services    Patient Name:  Yahaira Gurrola  YOB: 1965  MRN: [id number]  Admit Date:  (Not on file)    Pt called this RDN with questions about variety in foods.  Wanted RDN to mail info on ideas for lunch. Will mail the newest info on pantry list, carb counting and sample menus to choose from with a handwritten note re some ideas for home RDNs have used. Pt is asking when she can come back for appt. Will contact patient when can see patients face to face. This RDN will follow by phone as needed.  Electronically signed by:  Suzette Rausch RD  05/12/20 11:28

## 2020-07-10 ENCOUNTER — HOSPITAL ENCOUNTER (OUTPATIENT)
Dept: NUTRITION | Facility: HOSPITAL | Age: 55
Discharge: HOME | End: 2020-07-10
Admitting: DIETITIAN, REGISTERED

## 2020-07-10 VITALS — BODY MASS INDEX: 28.92 KG/M2 | WEIGHT: 153 LBS

## 2020-07-10 PROCEDURE — 97803 MED NUTRITION INDIV SUBSEQ: CPT | Performed by: DIETITIAN, REGISTERED

## 2020-08-24 ENCOUNTER — HOSPITAL ENCOUNTER (OUTPATIENT)
Dept: NUTRITION | Facility: HOSPITAL | Age: 55
Discharge: HOME | End: 2020-08-24
Admitting: DIETITIAN, REGISTERED

## 2020-08-24 VITALS — WEIGHT: 151 LBS | BODY MASS INDEX: 28.55 KG/M2

## 2020-08-24 PROCEDURE — 97803 MED NUTRITION INDIV SUBSEQ: CPT | Performed by: DIETITIAN, REGISTERED

## 2020-08-24 NOTE — PROGRESS NOTES
Adult Outpatient Nutrition  Assessment    Patient Name:  Yahaira Gurrola  YOB: 1965  MRN: [id number]    Assessment Date:  8/24/2020    Comments:  Pt returned for a follow up visit. She has lost 2 lbs since our visit in July. She will see dr. flynn in sept for an aic recheck with other labs she reports. Pt brought her food journal for review. Pt correctly is consuming 45 grams of carbohydrate per meal from the food journal. She is feeling well, exercising by walking 2x per week and is pleased with her progress. Her  doesn't follow dm guidelines which is hard for pt to watch but she wants to avoid dm complications. She wants to return in 1 month for more eduation. Goal pt agreed upon was to increase physical activity to 3-4 times per week. RDN will follow then.    General Info     Row Name 08/24/20 1607       Today's Session    Person(s) attending today's session  Patient       General Information    Lives With  spouse        Physical Findings     Row Name 08/24/20 1607          Physical Findings    Overall Physical Appearance  overweight         Anthropometrics     Row Name 08/24/20 1607          Anthropometrics    Weight  68.5 kg (151 lb)         Nutritional Info/Activity     Row Name 08/24/20 1607       Nutritional Information    Have you had weight changes?  Yes    Describe weight changes  2 lb since last visit in july    What is your desired body weight?  61.2 kg (135 lb)    What is your motivation to lose weight?  lower aic. be healthy. prevent complications of dm.    Functional Status  able to prepare meals;able to purchase food;ambulatory    What is the biggest challenge you have with your diet?  Knowledge       Eating Environment    Eating environment  Family       Physical Activity    Are you currently involved in an activity/exercise program?   Yes    Describe physical activity  walking 2 x per week.    How many minutes do you spend on exercise each day?  35    How would you  rank exercise as an important health lifestyle practice?  8        Home Nutrition Report     Row Name 08/24/20 1609          Home Nutrition Report    Typical Food/Fluid Intake  pt brought her food record for review. she is very close to 45 grams of cho per meal.     Food Preferences  loves fruits and vegetables                   Electronically signed by:  Suzette Rausch RD  08/24/20 16:12

## 2021-04-19 ENCOUNTER — IMMUNIZATION (OUTPATIENT)
Dept: VACCINE CLINIC | Facility: HOSPITAL | Age: 56
End: 2021-04-19

## 2021-04-19 PROCEDURE — 0001A: CPT | Performed by: THORACIC SURGERY (CARDIOTHORACIC VASCULAR SURGERY)

## 2021-04-19 PROCEDURE — 91300 HC SARSCOV02 VAC 30MCG/0.3ML IM: CPT | Performed by: THORACIC SURGERY (CARDIOTHORACIC VASCULAR SURGERY)

## 2021-05-10 ENCOUNTER — IMMUNIZATION (OUTPATIENT)
Dept: VACCINE CLINIC | Facility: HOSPITAL | Age: 56
End: 2021-05-10

## 2021-05-10 PROCEDURE — 91300 HC SARSCOV02 VAC 30MCG/0.3ML IM: CPT | Performed by: THORACIC SURGERY (CARDIOTHORACIC VASCULAR SURGERY)

## 2021-05-10 PROCEDURE — 0002A: CPT | Performed by: THORACIC SURGERY (CARDIOTHORACIC VASCULAR SURGERY)

## 2022-07-18 ENCOUNTER — OFFICE VISIT (OUTPATIENT)
Dept: OBSTETRICS AND GYNECOLOGY | Facility: CLINIC | Age: 57
End: 2022-07-18

## 2022-07-18 VITALS — SYSTOLIC BLOOD PRESSURE: 146 MMHG | DIASTOLIC BLOOD PRESSURE: 82 MMHG | WEIGHT: 178.4 LBS | BODY MASS INDEX: 33.73 KG/M2

## 2022-07-18 DIAGNOSIS — N95.1 MENOPAUSAL SYMPTOMS: ICD-10-CM

## 2022-07-18 DIAGNOSIS — Z53.8 PAP SMEAR OF CERVIX NOT NEEDED: ICD-10-CM

## 2022-07-18 DIAGNOSIS — N95.2 VAGINAL ATROPHY: ICD-10-CM

## 2022-07-18 DIAGNOSIS — N90.89 VULVAR IRRITATION: ICD-10-CM

## 2022-07-18 DIAGNOSIS — Z01.419 WOMEN'S ANNUAL ROUTINE GYNECOLOGICAL EXAMINATION: Primary | ICD-10-CM

## 2022-07-18 PROCEDURE — 3008F BODY MASS INDEX DOCD: CPT | Performed by: OBSTETRICS & GYNECOLOGY

## 2022-07-18 PROCEDURE — 2014F MENTAL STATUS ASSESS: CPT | Performed by: OBSTETRICS & GYNECOLOGY

## 2022-07-18 PROCEDURE — G0101 CA SCREEN;PELVIC/BREAST EXAM: HCPCS | Performed by: OBSTETRICS & GYNECOLOGY

## 2022-07-18 RX ORDER — BETHANECHOL CHLORIDE 50 MG/1
TABLET ORAL
COMMUNITY
Start: 2022-07-14 | End: 2023-02-06 | Stop reason: SDUPTHER

## 2022-07-18 RX ORDER — FLUTICASONE PROPIONATE 50 MCG
SPRAY, SUSPENSION (ML) NASAL
COMMUNITY
Start: 2022-03-09 | End: 2023-02-06

## 2022-07-18 RX ORDER — MAGNESIUM OXIDE 400 MG/1
400 TABLET ORAL DAILY
COMMUNITY
Start: 2022-02-11

## 2022-07-18 RX ORDER — LAMOTRIGINE 200 MG/1
TABLET ORAL
COMMUNITY
Start: 2022-06-22 | End: 2023-02-06 | Stop reason: SDUPTHER

## 2022-07-18 RX ORDER — OXYBUTYNIN CHLORIDE 5 MG/1
5 TABLET ORAL
COMMUNITY
Start: 2022-06-03 | End: 2023-02-06

## 2022-07-18 RX ORDER — NYSTATIN 100000 U/G
CREAM TOPICAL
COMMUNITY
Start: 2022-06-03 | End: 2023-02-06

## 2022-07-18 RX ORDER — CYANOCOBALAMIN 1000 UG/ML
1000 INJECTION, SOLUTION INTRAMUSCULAR; SUBCUTANEOUS
COMMUNITY
End: 2023-02-06

## 2022-07-18 RX ORDER — FLUCONAZOLE 150 MG/1
150 TABLET ORAL DAILY
Qty: 10 TABLET | Refills: 0 | Status: SHIPPED | OUTPATIENT
Start: 2022-07-18 | End: 2022-07-28

## 2022-07-18 RX ORDER — PRAVASTATIN SODIUM 20 MG
TABLET ORAL
COMMUNITY
Start: 2022-06-23 | End: 2022-08-24

## 2022-07-18 RX ORDER — ERGOCALCIFEROL 1.25 MG/1
CAPSULE ORAL
COMMUNITY
Start: 2022-04-06

## 2022-07-18 RX ORDER — METFORMIN HYDROCHLORIDE 500 MG/1
TABLET, EXTENDED RELEASE ORAL
COMMUNITY
Start: 2022-06-22 | End: 2022-08-15 | Stop reason: SDUPTHER

## 2022-07-18 RX ORDER — CONJUGATED ESTROGENS 0.62 MG/G
CREAM VAGINAL
Qty: 30 G | Refills: 3 | Status: SHIPPED | OUTPATIENT
Start: 2022-07-18

## 2022-07-18 RX ORDER — LEVOTHYROXINE SODIUM 0.12 MG/1
125 TABLET ORAL
COMMUNITY
Start: 2022-06-20 | End: 2022-12-21 | Stop reason: SDUPTHER

## 2022-07-18 RX ORDER — LOSARTAN POTASSIUM 50 MG/1
50 TABLET ORAL DAILY
COMMUNITY
Start: 2022-06-24 | End: 2023-02-19 | Stop reason: SDUPTHER

## 2022-07-18 RX ORDER — GLIPIZIDE 5 MG/1
TABLET, FILM COATED, EXTENDED RELEASE ORAL
COMMUNITY
Start: 2022-07-14 | End: 2023-02-06

## 2022-07-18 RX ORDER — TAMSULOSIN HYDROCHLORIDE 0.4 MG/1
0.4 CAPSULE ORAL DAILY
COMMUNITY

## 2022-08-15 ENCOUNTER — OFFICE VISIT (OUTPATIENT)
Dept: FAMILY MEDICINE CLINIC | Facility: CLINIC | Age: 57
End: 2022-08-15

## 2022-08-15 VITALS
BODY MASS INDEX: 33.61 KG/M2 | WEIGHT: 178 LBS | RESPIRATION RATE: 24 BRPM | HEART RATE: 84 BPM | OXYGEN SATURATION: 99 % | HEIGHT: 61 IN | SYSTOLIC BLOOD PRESSURE: 138 MMHG | DIASTOLIC BLOOD PRESSURE: 88 MMHG

## 2022-08-15 DIAGNOSIS — F31.74 BIPOLAR DISORDER, IN FULL REMISSION, MOST RECENT EPISODE MANIC: ICD-10-CM

## 2022-08-15 DIAGNOSIS — I10 PRIMARY HYPERTENSION: ICD-10-CM

## 2022-08-15 DIAGNOSIS — E55.9 VITAMIN D DEFICIENCY: ICD-10-CM

## 2022-08-15 DIAGNOSIS — E78.00 HYPERCHOLESTEREMIA: ICD-10-CM

## 2022-08-15 DIAGNOSIS — E11.65 TYPE 2 DIABETES MELLITUS WITH HYPERGLYCEMIA, WITHOUT LONG-TERM CURRENT USE OF INSULIN: ICD-10-CM

## 2022-08-15 DIAGNOSIS — E03.9 HYPOTHYROIDISM, UNSPECIFIED TYPE: ICD-10-CM

## 2022-08-15 DIAGNOSIS — E11.9 COMPREHENSIVE DIABETIC FOOT EXAMINATION, TYPE 2 DM, ENCOUNTER FOR: ICD-10-CM

## 2022-08-15 DIAGNOSIS — Z76.89 ENCOUNTER TO ESTABLISH CARE: Primary | ICD-10-CM

## 2022-08-15 PROBLEM — Z98.41 CATARACT EXTRACTION STATUS OF EYE, RIGHT: Status: ACTIVE | Noted: 2019-04-18

## 2022-08-15 PROBLEM — L70.0 ACNE VULGARIS: Status: ACTIVE | Noted: 2017-08-09

## 2022-08-15 PROBLEM — N20.0 NEPHROLITHIASIS: Status: ACTIVE | Noted: 2020-02-12

## 2022-08-15 PROBLEM — G47.9 SLEEP DISTURBANCE: Status: ACTIVE | Noted: 2018-08-20

## 2022-08-15 PROBLEM — F41.1 GAD (GENERALIZED ANXIETY DISORDER): Status: ACTIVE | Noted: 2019-01-11

## 2022-08-15 PROBLEM — E66.9 CLASS 2 OBESITY: Status: ACTIVE | Noted: 2017-12-05

## 2022-08-15 PROCEDURE — 99204 OFFICE O/P NEW MOD 45 MIN: CPT | Performed by: NURSE PRACTITIONER

## 2022-08-15 RX ORDER — METFORMIN HYDROCHLORIDE 500 MG/1
1000 TABLET, EXTENDED RELEASE ORAL
COMMUNITY
Start: 2021-11-08 | End: 2022-08-15

## 2022-08-15 RX ORDER — FLUCONAZOLE 150 MG/1
150 TABLET ORAL
COMMUNITY
Start: 2022-07-18 | End: 2023-02-06

## 2022-08-15 RX ORDER — TRAMADOL HYDROCHLORIDE 50 MG/1
TABLET ORAL
COMMUNITY
Start: 2022-06-03 | End: 2022-08-15

## 2022-08-15 RX ORDER — LAMOTRIGINE 200 MG/1
200 TABLET ORAL
COMMUNITY
Start: 2022-06-23

## 2022-08-15 RX ORDER — NITROFURANTOIN 25; 75 MG/1; MG/1
CAPSULE ORAL
COMMUNITY
Start: 2022-06-03 | End: 2022-08-15

## 2022-08-15 NOTE — PROGRESS NOTES
Chief Complaint  Establish Care    Subjective          Yahaira Gurrola presents to [facility] [place] to establish care. Has a history of kidney stones, had surgery this year for kidney stones. Has an extensive medical history. Follows with urologist as well as mental health.   Diabetes  She presents for her initial diabetic visit. She has type 2 diabetes mellitus. Her disease course has been fluctuating. Hypoglycemia symptoms include dizziness and nervousness/anxiousness. Associated symptoms include fatigue. Symptoms are stable. Risk factors for coronary artery disease include dyslipidemia, diabetes mellitus, hypertension and obesity. Current diabetic treatment includes oral agent (dual therapy). She is compliant with treatment most of the time.   Hypothyroidism  This is a chronic problem. The current episode started more than 1 year ago. The problem occurs constantly. Associated symptoms include fatigue. Nothing aggravates the symptoms. Treatments tried: levothyroxine  The treatment provided mild relief.   Hypertension  This is a chronic problem. The current episode started more than 1 year ago. The problem has been waxing and waning since onset. The problem is controlled. Associated symptoms include anxiety and palpitations. Risk factors for coronary artery disease include diabetes mellitus, dyslipidemia, obesity and stress. Past treatments include angiotensin blockers and beta blockers. Current antihypertension treatment includes angiotensin blockers and beta blockers. The current treatment provides mild improvement.   Hyperlipidemia  This is a chronic problem. The current episode started more than 1 year ago. Exacerbating diseases include diabetes, hypothyroidism and obesity. Factors aggravating her hyperlipidemia include beta blockers. Current antihyperlipidemic treatment includes statins. The current treatment provides mild improvement of lipids. Risk  factors for coronary artery disease include dyslipidemia, obesity and hypertension.   Anxiety  Presents for initial visit. The problem has been waxing and waning. Symptoms include dizziness, nervous/anxious behavior and palpitations. Symptoms occur most days. The severity of symptoms is moderate.     Her past medical history is significant for anxiety/panic attacks and bipolar disorder. Compliance with prior treatments has been variable.     Outpatient Medications Prior to Visit   Medication Sig Dispense Refill   • bethanechol (URECHOLINE) 50 MG tablet      • cyanocobalamin 1000 MCG/ML injection Inject 1,000 mcg into the appropriate muscle as directed by prescriber.     • ergocalciferol (ERGOCALCIFEROL) 1.25 MG (99819 UT) capsule TAKE 1 CAPSULE BY MOUTH EVERY OTHER WEEK     • fluconazole (DIFLUCAN) 150 MG tablet Take 150 mg by mouth.     • fluticasone (FLONASE) 50 MCG/ACT nasal spray SMARTSIG:Spray(s) Both Nares     • glipizide (GLUCOTROL XL) 5 MG ER tablet      • hydrOXYzine (VISTARIL) 25 MG capsule Take 50 mg by mouth 3 (Three) Times a Day As Needed for Itching.     • lamoTRIgine (LaMICtal) 200 MG tablet      • lamoTRIgine (LaMICtal) 200 MG tablet Take 200 mg by mouth.     • levothyroxine (SYNTHROID, LEVOTHROID) 125 MCG tablet Take 125 mcg by mouth.     • linaclotide (LINZESS) 290 MCG capsule capsule Take 290 mcg by mouth Daily.     • losartan (COZAAR) 50 MG tablet Take 50 mg by mouth Daily.     • magnesium oxide (MAG-OX) 400 MG tablet Take 400 mg by mouth Daily.     • metFORMIN (GLUCOPHAGE) 500 MG tablet Take 1,000 mg by mouth 2 (Two) Times a Day With Meals.     • nystatin (MYCOSTATIN) 823764 UNIT/GM cream Apply  topically to the appropriate area as directed.     • oxybutynin (DITROPAN) 5 MG tablet Take 5 mg by mouth.     • propranolol (INDERAL) 10 MG tablet Take 20 mg by mouth Daily.     • QUEtiapine XR (SEROquel XR) 400 MG 24 hr tablet Take 300 mg by mouth Every Night.     • tamsulosin (FLOMAX) 0.4 MG capsule  "24 hr capsule Take 0.4 mg by mouth Daily.     • losartan (COZAAR) 25 MG tablet Take 1 tablet by mouth Daily. 30 tablet 0   • metFORMIN ER (GLUCOPHAGE-XR) 500 MG 24 hr tablet Take 1,000 mg by mouth.     • pravastatin (PRAVACHOL) 20 MG tablet      • conjugated estrogens (Premarin) 0.625 MG/GM vaginal cream Insert 0.5g (pea-sized amount) in the vagina twice per week at night. 30 g 3   • halobetasol (ULTRAVATE) 0.05 % cream Apply 1 application topically to the appropriate area as directed 2 (Two) Times a Day. Apply to the outside of the vagina. 50 g 3   • levothyroxine (SYNTHROID, LEVOTHROID) 200 MCG tablet Take 200 mcg by mouth Daily.     • lithium 600 MG capsule Take 600 mg by mouth 2 (Two) Times a Day With Meals.     • metFORMIN ER (GLUCOPHAGE-XR) 500 MG 24 hr tablet      • nitrofurantoin, macrocrystal-monohydrate, (MACROBID) 100 MG capsule      • traMADol (ULTRAM) 50 MG tablet        No facility-administered medications prior to visit.       Review of Systems   Constitutional: Positive for fatigue.   Cardiovascular: Positive for palpitations.   Neurological: Positive for dizziness.   Psychiatric/Behavioral: The patient is nervous/anxious.          Objective   Vital Signs:   Visit Vitals  /88 (BP Location: Left arm, Patient Position: Sitting, Cuff Size: Adult)   Pulse 84   Resp 24   Ht 154.9 cm (60.98\")   Wt 80.7 kg (178 lb)   SpO2 99%   BMI 33.65 kg/m²     Physical Exam  Vitals and nursing note reviewed.   Constitutional:       Appearance: She is well-developed.   HENT:      Head: Normocephalic and atraumatic.   Eyes:      General: Lids are normal.      Conjunctiva/sclera: Conjunctivae normal.   Neck:      Thyroid: No thyroid mass or thyromegaly.      Trachea: Trachea normal. No tracheal tenderness.   Cardiovascular:      Rate and Rhythm: Normal rate.      Pulses: Normal pulses.      Heart sounds: Normal heart sounds.   Pulmonary:      Effort: Pulmonary effort is normal. No respiratory distress.      Breath " sounds: Normal breath sounds. No wheezing.   Abdominal:      General: There is no distension.      Palpations: Abdomen is soft. There is no mass.   Musculoskeletal:         General: Normal range of motion.      Cervical back: Normal range of motion. No edema.   Feet:      Right foot:      Skin integrity: Skin integrity normal. No ulcer, blister, skin breakdown, erythema, callus or dry skin.      Toenail Condition: Right toenails are normal.      Left foot:      Skin integrity: Skin integrity normal. No ulcer, blister, skin breakdown, erythema, callus or dry skin.      Toenail Condition: Left toenails are normal.      Comments: DM foot exam   Lymphadenopathy:      Head:      Right side of head: No submental, submandibular or tonsillar adenopathy.      Left side of head: No submental, submandibular or tonsillar adenopathy.   Skin:     General: Skin is warm and dry.      Coloration: Skin is not pale.      Findings: No abrasion, erythema or lesion.   Neurological:      Mental Status: She is alert and oriented to person, place, and time.   Psychiatric:         Mood and Affect: Mood is not anxious. Affect is not inappropriate.         Speech: Speech normal.         Behavior: Behavior normal.         Thought Content: Thought content normal.         Judgment: Judgment normal. Judgment is not impulsive.        Result Review :                 Assessment and Plan    Diagnoses and all orders for this visit:    1. Encounter to establish care (Primary)    2. Type 2 diabetes mellitus with hyperglycemia, without long-term current use of insulin (HCC)  -     TSH; Future  -     Comprehensive Metabolic Panel; Future  -     Hemoglobin A1c; Future  -     CBC & Differential; Future  -     Vitamin B12; Future  -     Lipid Panel; Future  -     Hepatitis C Antibody; Future  -     Microalbumin / Creatinine Urine Ratio - Urine, Clean Catch; Future  -     Ambulatory Referral to Diabetic Education    3. Primary hypertension  -     TSH;  Future  -     Comprehensive Metabolic Panel; Future  -     Hemoglobin A1c; Future  -     CBC & Differential; Future  -     Vitamin B12; Future  -     Lipid Panel; Future  -     Hepatitis C Antibody; Future  -     Microalbumin / Creatinine Urine Ratio - Urine, Clean Catch; Future    4. Hypercholesteremia  -     TSH; Future  -     Comprehensive Metabolic Panel; Future  -     Hemoglobin A1c; Future  -     CBC & Differential; Future  -     Vitamin B12; Future  -     Lipid Panel; Future  -     Hepatitis C Antibody; Future  -     Microalbumin / Creatinine Urine Ratio - Urine, Clean Catch; Future    5. Hypothyroidism, unspecified type  -     TSH; Future  -     Comprehensive Metabolic Panel; Future  -     Hemoglobin A1c; Future  -     CBC & Differential; Future  -     Vitamin B12; Future  -     Lipid Panel; Future  -     Hepatitis C Antibody; Future  -     Microalbumin / Creatinine Urine Ratio - Urine, Clean Catch; Future  -     Thyroid Peroxidase Antibody; Future    6. Vitamin D deficiency  -     Vitamin D 25 Hydroxy; Future    7. Bipolar disorder, in full remission, most recent episode manic (HCC)    8. Comprehensive diabetic foot examination, type 2 DM, encounter for (HCC)      Complete ordered lab work  We will call with results     Curious about diabetic education, I will send referral      Continue current medications for now    Please call the office if you have any issues        Follow Up   Return in about 3 months (around 11/15/2022), or if symptoms worsen or fail to improve.  Patient was given instructions and counseling regarding her condition or for health maintenance advice. Please see specific information pulled into the AVS if appropriate.           This document has been electronically signed by LUIS EDUARDO Barrios on September 1, 2022 08:16 CDT

## 2022-08-18 ENCOUNTER — LAB (OUTPATIENT)
Dept: LAB | Facility: HOSPITAL | Age: 57
End: 2022-08-18

## 2022-08-18 DIAGNOSIS — E11.65 TYPE 2 DIABETES MELLITUS WITH HYPERGLYCEMIA, WITHOUT LONG-TERM CURRENT USE OF INSULIN: ICD-10-CM

## 2022-08-18 DIAGNOSIS — E78.00 HYPERCHOLESTEREMIA: ICD-10-CM

## 2022-08-18 DIAGNOSIS — E03.9 HYPOTHYROIDISM, UNSPECIFIED TYPE: ICD-10-CM

## 2022-08-18 DIAGNOSIS — E55.9 VITAMIN D DEFICIENCY: ICD-10-CM

## 2022-08-18 DIAGNOSIS — I10 PRIMARY HYPERTENSION: ICD-10-CM

## 2022-08-18 LAB
25(OH)D3 SERPL-MCNC: 34.7 NG/ML (ref 30–100)
ALBUMIN SERPL-MCNC: 4.8 G/DL (ref 3.5–5.2)
ALBUMIN/GLOB SERPL: 1.9 G/DL
ALP SERPL-CCNC: 73 U/L (ref 39–117)
ALT SERPL W P-5'-P-CCNC: 28 U/L (ref 1–33)
ANION GAP SERPL CALCULATED.3IONS-SCNC: 12.6 MMOL/L (ref 5–15)
AST SERPL-CCNC: 18 U/L (ref 1–32)
BASOPHILS # BLD AUTO: 0.07 10*3/MM3 (ref 0–0.2)
BASOPHILS NFR BLD AUTO: 1.2 % (ref 0–1.5)
BILIRUB SERPL-MCNC: 0.3 MG/DL (ref 0–1.2)
BUN SERPL-MCNC: 13 MG/DL (ref 6–20)
BUN/CREAT SERPL: 13.1 (ref 7–25)
CALCIUM SPEC-SCNC: 9.3 MG/DL (ref 8.6–10.5)
CHLORIDE SERPL-SCNC: 106 MMOL/L (ref 98–107)
CHOLEST SERPL-MCNC: 202 MG/DL (ref 0–200)
CO2 SERPL-SCNC: 23.4 MMOL/L (ref 22–29)
CREAT SERPL-MCNC: 0.99 MG/DL (ref 0.57–1)
DEPRECATED RDW RBC AUTO: 40.7 FL (ref 37–54)
EGFRCR SERPLBLD CKD-EPI 2021: 67.9 ML/MIN/1.73
EOSINOPHIL # BLD AUTO: 0.18 10*3/MM3 (ref 0–0.4)
EOSINOPHIL NFR BLD AUTO: 3 % (ref 0.3–6.2)
ERYTHROCYTE [DISTWIDTH] IN BLOOD BY AUTOMATED COUNT: 12.8 % (ref 12.3–15.4)
GLOBULIN UR ELPH-MCNC: 2.5 GM/DL
GLUCOSE SERPL-MCNC: 129 MG/DL (ref 65–99)
HBA1C MFR BLD: 6 % (ref 4.8–5.6)
HCT VFR BLD AUTO: 39.9 % (ref 34–46.6)
HCV AB SER DONR QL: NORMAL
HDLC SERPL-MCNC: 69 MG/DL (ref 40–60)
HGB BLD-MCNC: 13.9 G/DL (ref 12–15.9)
IMM GRANULOCYTES # BLD AUTO: 0.05 10*3/MM3 (ref 0–0.05)
IMM GRANULOCYTES NFR BLD AUTO: 0.8 % (ref 0–0.5)
LDLC SERPL CALC-MCNC: 122 MG/DL (ref 0–100)
LDLC/HDLC SERPL: 1.75 {RATIO}
LYMPHOCYTES # BLD AUTO: 1.84 10*3/MM3 (ref 0.7–3.1)
LYMPHOCYTES NFR BLD AUTO: 30.3 % (ref 19.6–45.3)
MCH RBC QN AUTO: 30.5 PG (ref 26.6–33)
MCHC RBC AUTO-ENTMCNC: 34.8 G/DL (ref 31.5–35.7)
MCV RBC AUTO: 87.5 FL (ref 79–97)
MONOCYTES # BLD AUTO: 0.51 10*3/MM3 (ref 0.1–0.9)
MONOCYTES NFR BLD AUTO: 8.4 % (ref 5–12)
NEUTROPHILS NFR BLD AUTO: 3.43 10*3/MM3 (ref 1.7–7)
NEUTROPHILS NFR BLD AUTO: 56.3 % (ref 42.7–76)
NRBC BLD AUTO-RTO: 0 /100 WBC (ref 0–0.2)
PLATELET # BLD AUTO: 207 10*3/MM3 (ref 140–450)
PMV BLD AUTO: 10.8 FL (ref 6–12)
POTASSIUM SERPL-SCNC: 3.9 MMOL/L (ref 3.5–5.2)
PROT SERPL-MCNC: 7.3 G/DL (ref 6–8.5)
RBC # BLD AUTO: 4.56 10*6/MM3 (ref 3.77–5.28)
SODIUM SERPL-SCNC: 142 MMOL/L (ref 136–145)
TRIGL SERPL-MCNC: 60 MG/DL (ref 0–150)
TSH SERPL DL<=0.05 MIU/L-ACNC: 0.99 UIU/ML (ref 0.27–4.2)
VIT B12 BLD-MCNC: 607 PG/ML (ref 211–946)
VLDLC SERPL-MCNC: 11 MG/DL (ref 5–40)
WBC NRBC COR # BLD: 6.08 10*3/MM3 (ref 3.4–10.8)

## 2022-08-18 PROCEDURE — 84443 ASSAY THYROID STIM HORMONE: CPT

## 2022-08-18 PROCEDURE — 86803 HEPATITIS C AB TEST: CPT

## 2022-08-18 PROCEDURE — 82607 VITAMIN B-12: CPT

## 2022-08-18 PROCEDURE — 80053 COMPREHEN METABOLIC PANEL: CPT

## 2022-08-18 PROCEDURE — 82306 VITAMIN D 25 HYDROXY: CPT

## 2022-08-18 PROCEDURE — 85025 COMPLETE CBC W/AUTO DIFF WBC: CPT

## 2022-08-18 PROCEDURE — 80061 LIPID PANEL: CPT

## 2022-08-18 PROCEDURE — 36415 COLL VENOUS BLD VENIPUNCTURE: CPT

## 2022-08-18 PROCEDURE — 86376 MICROSOMAL ANTIBODY EACH: CPT

## 2022-08-18 PROCEDURE — 83036 HEMOGLOBIN GLYCOSYLATED A1C: CPT

## 2022-08-19 LAB — THYROPEROXIDASE AB SERPL-ACNC: <8 IU/ML (ref 0–34)

## 2022-08-22 ENCOUNTER — OFFICE VISIT (OUTPATIENT)
Dept: ENDOCRINOLOGY | Facility: CLINIC | Age: 57
End: 2022-08-22

## 2022-08-22 DIAGNOSIS — E11.65 TYPE 2 DIABETES MELLITUS WITH HYPERGLYCEMIA, WITHOUT LONG-TERM CURRENT USE OF INSULIN: ICD-10-CM

## 2022-08-22 PROCEDURE — G0108 DIAB MANAGE TRN  PER INDIV: HCPCS | Performed by: DIETITIAN, REGISTERED

## 2022-08-22 NOTE — PROGRESS NOTES
Yahaira Gurrola is a 56 y.o. female referred for outpatient diabetes education by Leeann HOFF. Patient attended individual education for 1 hour on 08/22/2022. Topics covered included:     1. Healthy Food Choices   i. Provided patient with detailed carbohydrate counting guide.    ii. Instructed patient to eat 45-60 60-75 grams of carbohydrate with each meal (3-4 4-5 exchange choices) and 15-30 grams of carb for snacks (1-2 exchange choices).   iii. Provided patient with list of non-starchy vegetables and foods that are low in carbohydrate for snacks and to incorporate with meals (Planning Healthy Meals Packet).    iv. Choose fruits, vegetables, whole grains, legumes, low-fat milk, fiber-rich foods, minimal saturated fats, and watch cholesterol and sodium intake.    v. Reviewed carbohydrate-containing foods, standard serving sizes, and measuring foods.   vi. Reviewed the difference between simple and complex carbohydrate. Encouraged patient to choose complex carbohydrates more often.      2. Hyperglycemia/Hypoglycemia   i. Discussed signs, symptoms, and difference between hypo/hyperglycemia - and the proper treatment guidelines for both.    ii. Explained A1C and the average blood sugar that goes along with the A1C level.    iii. Discussed how to check blood sugar. Stated when checking blood sugar to check different times of the day to see if there is a pattern. For example: Check fasting blood glucose in the morning, check before lunch, check 2 hours after a meal, and at bedtime. Reminded to check blood sugar if ever feels jittery to know if blood sugar is high or low.      Provided patient with Diabetes and You book, and Planning Healthy Meals book. Provided handout of sample menu with carbs listed.      Thank you Leeann HOFF for this referral.        LILIA Rojas, RD, LD

## 2022-08-24 ENCOUNTER — OFFICE VISIT (OUTPATIENT)
Dept: FAMILY MEDICINE CLINIC | Facility: CLINIC | Age: 57
End: 2022-08-24

## 2022-08-24 VITALS
DIASTOLIC BLOOD PRESSURE: 78 MMHG | BODY MASS INDEX: 33.99 KG/M2 | HEIGHT: 61 IN | OXYGEN SATURATION: 98 % | HEART RATE: 84 BPM | SYSTOLIC BLOOD PRESSURE: 132 MMHG | WEIGHT: 180 LBS | RESPIRATION RATE: 24 BRPM

## 2022-08-24 DIAGNOSIS — E03.9 HYPOTHYROIDISM, UNSPECIFIED TYPE: ICD-10-CM

## 2022-08-24 DIAGNOSIS — E66.09 CLASS 1 OBESITY DUE TO EXCESS CALORIES WITH SERIOUS COMORBIDITY AND BODY MASS INDEX (BMI) OF 34.0 TO 34.9 IN ADULT: ICD-10-CM

## 2022-08-24 DIAGNOSIS — E78.2 MIXED HYPERLIPIDEMIA: Primary | ICD-10-CM

## 2022-08-24 DIAGNOSIS — E11.65 TYPE 2 DIABETES MELLITUS WITH HYPERGLYCEMIA, WITHOUT LONG-TERM CURRENT USE OF INSULIN: ICD-10-CM

## 2022-08-24 PROCEDURE — 99214 OFFICE O/P EST MOD 30 MIN: CPT | Performed by: NURSE PRACTITIONER

## 2022-08-24 RX ORDER — BETHANECHOL CHLORIDE 50 MG/1
1 TABLET ORAL 3 TIMES DAILY
COMMUNITY
Start: 2022-08-23

## 2022-08-24 RX ORDER — ROSUVASTATIN CALCIUM 10 MG/1
10 TABLET, COATED ORAL DAILY
Qty: 30 TABLET | Refills: 11 | Status: SHIPPED | OUTPATIENT
Start: 2022-08-24

## 2022-08-24 RX ORDER — LANCETS 28 GAUGE
EACH MISCELLANEOUS
Qty: 100 EACH | Refills: 11 | Status: SHIPPED | OUTPATIENT
Start: 2022-08-24

## 2022-08-24 NOTE — PROGRESS NOTES
Chief Complaint  Lab results    Subjective          Yahaira Gurrola presents to [facility] [place] to discuss recent lab work.   Diabetes  She presents for her follow-up diabetic visit. She has type 2 diabetes mellitus. Her disease course has been fluctuating. Hypoglycemia symptoms include dizziness and nervousness/anxiousness. Associated symptoms include fatigue. Symptoms are stable. Risk factors for coronary artery disease include dyslipidemia, diabetes mellitus, hypertension and obesity. Current diabetic treatment includes oral agent (dual therapy). She is compliant with treatment most of the time.   Hypothyroidism  This is a chronic problem. The current episode started more than 1 year ago. The problem occurs constantly. Associated symptoms include fatigue. Nothing aggravates the symptoms. Treatments tried: levothyroxine  The treatment provided mild relief.   Hypertension  This is a chronic problem. The current episode started more than 1 year ago. The problem has been waxing and waning since onset. The problem is controlled. Associated symptoms include anxiety and palpitations. Risk factors for coronary artery disease include diabetes mellitus, dyslipidemia, obesity and stress. Past treatments include angiotensin blockers and beta blockers. Current antihypertension treatment includes angiotensin blockers and beta blockers. The current treatment provides mild improvement.   Hyperlipidemia  This is a chronic problem. The current episode started more than 1 year ago. Exacerbating diseases include diabetes, hypothyroidism and obesity. Factors aggravating her hyperlipidemia include beta blockers. Current antihyperlipidemic treatment includes statins. The current treatment provides mild improvement of lipids. Risk factors for coronary artery disease include dyslipidemia, obesity and hypertension.   Anxiety  Presents for follow-up visit. The problem has been waxing  and waning. Symptoms include dizziness, nervous/anxious behavior and palpitations. Symptoms occur most days. The severity of symptoms is moderate.     Her past medical history is significant for anxiety/panic attacks and bipolar disorder. Compliance with prior treatments has been variable.     Outpatient Medications Prior to Visit   Medication Sig Dispense Refill   • bethanechol (URECHOLINE) 50 MG tablet      • bethanechol (URECHOLINE) 50 MG tablet Take 1 tablet by mouth 3 (Three) Times a Day.     • conjugated estrogens (Premarin) 0.625 MG/GM vaginal cream Insert 0.5g (pea-sized amount) in the vagina twice per week at night. 30 g 3   • cyanocobalamin 1000 MCG/ML injection Inject 1,000 mcg into the appropriate muscle as directed by prescriber.     • ergocalciferol (ERGOCALCIFEROL) 1.25 MG (89206 UT) capsule TAKE 1 CAPSULE BY MOUTH EVERY OTHER WEEK     • fluconazole (DIFLUCAN) 150 MG tablet Take 150 mg by mouth.     • fluticasone (FLONASE) 50 MCG/ACT nasal spray SMARTSIG:Spray(s) Both Nares     • glipizide (GLUCOTROL XL) 5 MG ER tablet      • hydrOXYzine (VISTARIL) 25 MG capsule Take 50 mg by mouth 3 (Three) Times a Day As Needed for Itching.     • lamoTRIgine (LaMICtal) 200 MG tablet      • lamoTRIgine (LaMICtal) 200 MG tablet Take 200 mg by mouth.     • levothyroxine (SYNTHROID, LEVOTHROID) 125 MCG tablet Take 125 mcg by mouth.     • linaclotide (LINZESS) 290 MCG capsule capsule Take 290 mcg by mouth Daily.     • losartan (COZAAR) 50 MG tablet Take 50 mg by mouth Daily.     • magnesium oxide (MAG-OX) 400 MG tablet Take 400 mg by mouth Daily.     • metFORMIN (GLUCOPHAGE) 500 MG tablet Take 1,000 mg by mouth 2 (Two) Times a Day With Meals.     • nystatin (MYCOSTATIN) 894744 UNIT/GM cream Apply  topically to the appropriate area as directed.     • oxybutynin (DITROPAN) 5 MG tablet Take 5 mg by mouth.     • propranolol (INDERAL) 10 MG tablet Take 20 mg by mouth Daily.     • QUEtiapine XR (SEROquel XR) 400 MG 24 hr  "tablet Take 300 mg by mouth Every Night.     • tamsulosin (FLOMAX) 0.4 MG capsule 24 hr capsule Take 0.4 mg by mouth Daily.     • pravastatin (PRAVACHOL) 20 MG tablet        No facility-administered medications prior to visit.       Review of Systems   Constitutional: Positive for fatigue.   Cardiovascular: Positive for palpitations.   Neurological: Positive for dizziness.   Psychiatric/Behavioral: The patient is nervous/anxious.          Objective   Vital Signs:   Visit Vitals  /78 (BP Location: Left arm, Patient Position: Sitting)   Pulse 84   Resp 24   Ht 154.9 cm (60.98\")   Wt 81.6 kg (180 lb)   SpO2 98%   BMI 34.03 kg/m²     Physical Exam  Vitals and nursing note reviewed.   Constitutional:       Appearance: She is well-developed.   HENT:      Head: Normocephalic and atraumatic.   Eyes:      General: Lids are normal.      Conjunctiva/sclera: Conjunctivae normal.   Neck:      Thyroid: No thyroid mass or thyromegaly.      Trachea: Trachea normal. No tracheal tenderness.   Cardiovascular:      Rate and Rhythm: Normal rate.      Pulses: Normal pulses.      Heart sounds: Normal heart sounds.   Pulmonary:      Effort: Pulmonary effort is normal. No respiratory distress.      Breath sounds: Normal breath sounds. No wheezing.   Abdominal:      General: There is no distension.      Palpations: Abdomen is soft. There is no mass.   Musculoskeletal:         General: Normal range of motion.      Cervical back: Normal range of motion. No edema.   Skin:     General: Skin is warm and dry.      Coloration: Skin is not pale.      Findings: No abrasion, erythema or lesion.   Neurological:      Mental Status: She is alert and oriented to person, place, and time.   Psychiatric:         Mood and Affect: Mood is not anxious. Affect is not inappropriate.         Speech: Speech normal.         Behavior: Behavior normal.         Thought Content: Thought content normal.         Judgment: Judgment normal. Judgment is not impulsive. "        Result Review :     Common labs    Common Labsle 10/5/21 10/5/21 1/5/22 1/5/22 1/5/22 8/18/22 8/18/22 8/18/22 8/18/22    1046 1046 1231 1231 1231 0846 0846 0846 0846   Glucose        129 (A)    Glucose   103         BUN   17     13    Creatinine   0.9     0.99    Sodium   141     142    Potassium   4.3     3.9    Chloride   106     106    Calcium  9.2 9.6     9.3    Albumin   4.7     4.80    Total Bilirubin   0.45     0.3    Alkaline Phosphatase   67     73    AST (SGOT)   13     18    ALT (SGPT)   21     28    WBC      6.08      Hemoglobin      13.9      Hematocrit      39.9      Platelets      207      Total Cholesterol         202 (A)   Total Cholesterol    190        Triglycerides    69     60   HDL Cholesterol    64     69 (A)   LDL Cholesterol     112     122 (A)   Hemoglobin A1C     5.7  6.00 (A)     Uric Acid 3.0           (A) Abnormal value       Comments are available for some flowsheets but are not being displayed.                     Assessment and Plan    Diagnoses and all orders for this visit:    1. Mixed hyperlipidemia (Primary)  -     rosuvastatin (Crestor) 10 MG tablet; Take 1 tablet by mouth Daily.  Dispense: 30 tablet; Refill: 11    2. Type 2 diabetes mellitus with hyperglycemia, without long-term current use of insulin ([facility])  -     Blood Glucose Monitoring Suppl kit; Use daily to check glucose levels  Dispense: 1 each; Refill: 0  -     glucose blood test strip; Use as instructed  Dispense: 100 each; Refill: 11  -     Lancets (freestyle) lancets; Use daily to check blood glucose levels  Dispense: 100 each; Refill: 11  -     Semaglutide,0.25 or 0.5MG/DOS, (OZEMPIC) 2 MG/1.5ML solution pen-injector; Inject 0.25 mg under the skin into the appropriate area as directed 1 (One) Time Per Week.  Dispense: 1 pen; Refill: 1    3. Hypothyroidism, unspecified type    4. Class 1 obesity due to excess calories with serious comorbidity and body mass index (BMI) of 34.0 to 34.9 in adult      We will  change statin to Crestor     New glucose monitoring kit order sent to pharmacy     Discussed a1c, she would like to eventually come off some of her diabetes medication, we will try ozempic and see how she tolerates side effects  If tolerable we will more than likely be able to come off metformin or glipizide     In return Ozempic can also help us with weight loss, which she desires.     Educated on how to use Ozempic pen     Please call the office if you have issues           Follow Up   Return in about 3 months (around 11/24/2022), or if symptoms worsen or fail to improve, for Recheck.  Patient was given instructions and counseling regarding her condition or for health maintenance advice. Please see specific information pulled into the AVS if appropriate.           This document has been electronically signed by LUIS EDUARDO Barrios on August 25, 2022 10:44 CDT

## 2022-11-08 ENCOUNTER — OFFICE VISIT (OUTPATIENT)
Dept: ENDOCRINOLOGY | Facility: CLINIC | Age: 57
End: 2022-11-08

## 2022-11-08 VITALS — WEIGHT: 182.5 LBS | BODY MASS INDEX: 34.51 KG/M2

## 2022-11-08 DIAGNOSIS — E11.9 TYPE 2 DIABETES MELLITUS WITHOUT COMPLICATION, WITHOUT LONG-TERM CURRENT USE OF INSULIN: ICD-10-CM

## 2022-11-08 PROCEDURE — G0108 DIAB MANAGE TRN  PER INDIV: HCPCS | Performed by: DIETITIAN, REGISTERED

## 2022-11-08 NOTE — PROGRESS NOTES
Yahaira Gurrola is a 56 y.o. female referred for outpatient diabetes education by Leeann HOFF. Patient attended individual education for 1 hour on 11/08/2022. Topics covered included:     1. Healthy Food Choices   i. Provided patient with detailed carbohydrate counting guide.    ii. Instructed patient to eat 45-60 grams of carbohydrate with each meal (3-4 exchange choices) and 15 grams of carb for snacks (1 exchange choices).   iii. Provided patient with list of non-starchy vegetables and foods that are low in carbohydrate for snacks and to incorporate with meals (Planning Healthy Meals Packet).    iv. Choose fruits, vegetables, whole grains, legumes, low-fat milk, fiber-rich foods, minimal saturated fats, and watch cholesterol and sodium intake.    v. Reviewed carbohydrate-containing foods, standard serving sizes, and measuring foods.   vi. Reviewed the difference between simple and complex carbohydrate. Encouraged patient to choose complex carbohydrates more often.   vii. Reviewed label reading. Discussed looking at serving size, total carbohydrates, fiber, saturated fat, sodium. Reviewed  amounts of each to hav per day & per meal.      2. Discussed addition of Rybelsus to diabetic treatment plan. Pt to discuss with PCP. Pt weight 182.5# at time of visit.    Provided patient with Diabetes and You book, and Planning Healthy Meals book. Provided handout of sample menu with carbs listed.      Thank you Leeann HOFF for this referral.      LILIA Rojas, RD, LD

## 2022-11-10 ENCOUNTER — TELEPHONE (OUTPATIENT)
Dept: FAMILY MEDICINE CLINIC | Facility: CLINIC | Age: 57
End: 2022-11-10

## 2022-12-07 ENCOUNTER — TELEPHONE (OUTPATIENT)
Dept: FAMILY MEDICINE CLINIC | Facility: CLINIC | Age: 57
End: 2022-12-07

## 2022-12-07 DIAGNOSIS — E11.65 TYPE 2 DIABETES MELLITUS WITH HYPERGLYCEMIA, WITHOUT LONG-TERM CURRENT USE OF INSULIN: Primary | ICD-10-CM

## 2022-12-07 RX ORDER — METFORMIN HYDROCHLORIDE 500 MG/1
1000 TABLET, EXTENDED RELEASE ORAL 2 TIMES DAILY
Qty: 120 TABLET | Refills: 3 | Status: SHIPPED | OUTPATIENT
Start: 2022-12-07

## 2022-12-07 NOTE — TELEPHONE ENCOUNTER
[facility] - [place] [id number] [address] [phone] [facility] [phone] FX    Called received a script metFORMIN (GLUCOPHAGE) 500 MG tablet [69455] (Order [id number])         But says the patient had always taken the Extended Release and this one is not so has it changed or was this an error just wanted to verify change   Pharmacy [phone]

## 2022-12-07 NOTE — TELEPHONE ENCOUNTER
Next Abelino 12/12/2022    ----- Message from Yahaira Gurrola sent at 12/7/2022 10:28 AM CST -----  Regarding: refill on [employer]   Contact: [phone]  I don't have refills on my Methrorum 500MG two capsules   with meals I only have a few left.  I do have a appointment with you  Monday the 12th at 1.00.  i used [facility].  Thank You  Yahaira Gurrola  December 15th 1965

## 2022-12-08 ENCOUNTER — DOCUMENTATION (OUTPATIENT)
Dept: FAMILY MEDICINE CLINIC | Facility: CLINIC | Age: 57
End: 2022-12-08

## 2022-12-08 NOTE — PROGRESS NOTES
Renewal script request for the patient's Metformin has been approved, signed and faxed to [facility] at [phone]

## 2022-12-12 ENCOUNTER — OFFICE VISIT (OUTPATIENT)
Dept: FAMILY MEDICINE CLINIC | Facility: CLINIC | Age: 57
End: 2022-12-12

## 2022-12-12 VITALS
HEART RATE: 74 BPM | RESPIRATION RATE: 22 BRPM | DIASTOLIC BLOOD PRESSURE: 64 MMHG | OXYGEN SATURATION: 98 % | BODY MASS INDEX: 33.79 KG/M2 | WEIGHT: 179 LBS | HEIGHT: 61 IN | SYSTOLIC BLOOD PRESSURE: 124 MMHG

## 2022-12-12 DIAGNOSIS — F31.74 BIPOLAR DISORDER, IN FULL REMISSION, MOST RECENT EPISODE MANIC: ICD-10-CM

## 2022-12-12 DIAGNOSIS — E78.2 MIXED HYPERLIPIDEMIA: ICD-10-CM

## 2022-12-12 DIAGNOSIS — E03.9 HYPOTHYROIDISM, UNSPECIFIED TYPE: ICD-10-CM

## 2022-12-12 DIAGNOSIS — I10 PRIMARY HYPERTENSION: Primary | ICD-10-CM

## 2022-12-12 DIAGNOSIS — E11.9 TYPE 2 DIABETES MELLITUS WITHOUT COMPLICATION, WITHOUT LONG-TERM CURRENT USE OF INSULIN: ICD-10-CM

## 2022-12-12 PROCEDURE — 99214 OFFICE O/P EST MOD 30 MIN: CPT | Performed by: NURSE PRACTITIONER

## 2022-12-12 RX ORDER — DULAGLUTIDE 0.75 MG/.5ML
0.75 INJECTION, SOLUTION SUBCUTANEOUS WEEKLY
Qty: 2 ML | Refills: 2 | Status: SHIPPED | OUTPATIENT
Start: 2022-12-12 | End: 2023-02-06

## 2022-12-12 NOTE — PROGRESS NOTES
Chief Complaint  Hyperlipidemia (Follow up)    Subjective          Yahaira Gurrola presents to [facility] [place] for follow up. She is currently seperated from her  and has moved out of town. She is dealing okay with things, scared of change.   Diabetes  She presents for her follow-up diabetic visit. She has type 2 diabetes mellitus. Her disease course has been fluctuating. Hypoglycemia symptoms include dizziness and nervousness/anxiousness. Associated symptoms include fatigue. Symptoms are stable. Risk factors for coronary artery disease include dyslipidemia, diabetes mellitus, hypertension and obesity. Current diabetic treatment includes oral agent (dual therapy). She is compliant with treatment most of the time.   Hypothyroidism  This is a chronic problem. The current episode started more than 1 year ago. The problem occurs constantly. Associated symptoms include fatigue. Nothing aggravates the symptoms. Treatments tried: levothyroxine  The treatment provided mild relief.   Hypertension  This is a chronic problem. The current episode started more than 1 year ago. The problem has been waxing and waning since onset. The problem is controlled. Associated symptoms include anxiety and palpitations. Risk factors for coronary artery disease include diabetes mellitus, dyslipidemia, obesity and stress. Past treatments include angiotensin blockers and beta blockers. Current antihypertension treatment includes angiotensin blockers and beta blockers. The current treatment provides mild improvement.   Hyperlipidemia  This is a chronic problem. The current episode started more than 1 year ago. Exacerbating diseases include diabetes, hypothyroidism and obesity. Factors aggravating her hyperlipidemia include beta blockers. Current antihyperlipidemic treatment includes statins. The current treatment provides mild improvement of lipids. Risk factors for coronary artery  disease include dyslipidemia, obesity and hypertension.   Anxiety  Presents for follow-up visit. The problem has been waxing and waning. Symptoms include dizziness, nervous/anxious behavior and palpitations. Symptoms occur most days. The severity of symptoms is moderate.     Her past medical history is significant for anxiety/panic attacks and bipolar disorder. Compliance with prior treatments has been variable.     Outpatient Medications Prior to Visit   Medication Sig Dispense Refill   • bethanechol (URECHOLINE) 50 MG tablet      • bethanechol (URECHOLINE) 50 MG tablet Take 1 tablet by mouth 3 (Three) Times a Day.     • Blood Glucose Monitoring Suppl kit Use daily to check glucose levels 1 each 0   • conjugated estrogens (Premarin) 0.625 MG/GM vaginal cream Insert 0.5g (pea-sized amount) in the vagina twice per week at night. 30 g 3   • cyanocobalamin 1000 MCG/ML injection Inject 1,000 mcg into the appropriate muscle as directed by prescriber.     • ergocalciferol (ERGOCALCIFEROL) 1.25 MG (93047 UT) capsule TAKE 1 CAPSULE BY MOUTH EVERY OTHER WEEK     • fluconazole (DIFLUCAN) 150 MG tablet Take 150 mg by mouth.     • fluticasone (FLONASE) 50 MCG/ACT nasal spray SMARTSIG:Spray(s) Both Nares     • glipizide (GLUCOTROL XL) 5 MG ER tablet      • glucose blood test strip Use as instructed 100 each 11   • hydrOXYzine (VISTARIL) 25 MG capsule Take 50 mg by mouth 3 (Three) Times a Day As Needed for Itching.     • lamoTRIgine (LaMICtal) 200 MG tablet      • lamoTRIgine (LaMICtal) 200 MG tablet Take 200 mg by mouth.     • Lancets (freestyle) lancets Use daily to check blood glucose levels 100 each 11   • levothyroxine (SYNTHROID, LEVOTHROID) 125 MCG tablet Take 125 mcg by mouth.     • linaclotide (LINZESS) 290 MCG capsule capsule Take 290 mcg by mouth Daily.     • losartan (COZAAR) 50 MG tablet Take 50 mg by mouth Daily.     • magnesium oxide (MAG-OX) 400 MG tablet Take 400 mg by mouth Daily.     • metFORMIN ER  "(GLUCOPHAGE-XR) 500 MG 24 hr tablet Take 2 tablets by mouth 2 (Two) Times a Day. 120 tablet 3   • nystatin (MYCOSTATIN) 952251 UNIT/GM cream Apply  topically to the appropriate area as directed.     • oxybutynin (DITROPAN) 5 MG tablet Take 5 mg by mouth.     • propranolol (INDERAL) 10 MG tablet Take 20 mg by mouth Daily.     • QUEtiapine XR (SEROquel XR) 400 MG 24 hr tablet Take 300 mg by mouth Every Night.     • rosuvastatin (Crestor) 10 MG tablet Take 1 tablet by mouth Daily. 30 tablet 11   • tamsulosin (FLOMAX) 0.4 MG capsule 24 hr capsule Take 0.4 mg by mouth Daily.     • Semaglutide,0.25 or 0.5MG/DOS, (OZEMPIC) 2 MG/1.5ML solution pen-injector Inject 0.25 mg under the skin into the appropriate area as directed 1 (One) Time Per Week. 1 pen 1     No facility-administered medications prior to visit.       Review of Systems   Constitutional: Positive for fatigue.   Cardiovascular: Positive for palpitations.   Neurological: Positive for dizziness.   Psychiatric/Behavioral: The patient is nervous/anxious.          Objective   Vital Signs:   Visit Vitals  /64 (BP Location: Right arm, Patient Position: Sitting, Cuff Size: Large Adult)   Pulse 74   Resp 22   Ht 154.9 cm (60.98\")   Wt 81.2 kg (179 lb)   SpO2 98%   BMI 33.84 kg/m²     Physical Exam  Vitals and nursing note reviewed.   Constitutional:       Appearance: She is well-developed.   HENT:      Head: Normocephalic and atraumatic.   Eyes:      General: Lids are normal.      Conjunctiva/sclera: Conjunctivae normal.   Neck:      Thyroid: No thyroid mass or thyromegaly.      Trachea: Trachea normal. No tracheal tenderness.   Cardiovascular:      Rate and Rhythm: Normal rate.      Pulses: Normal pulses.      Heart sounds: Normal heart sounds.   Pulmonary:      Effort: Pulmonary effort is normal. No respiratory distress.      Breath sounds: Normal breath sounds. No wheezing.   Abdominal:      General: There is no distension.      Palpations: Abdomen is soft. " There is no mass.   Musculoskeletal:         General: Normal range of motion.      Cervical back: Normal range of motion. No edema.   Skin:     General: Skin is warm and dry.      Coloration: Skin is not pale.      Findings: No abrasion, erythema or lesion.   Neurological:      Mental Status: She is alert and oriented to person, place, and time.   Psychiatric:         Mood and Affect: Mood is not anxious. Affect is not inappropriate.         Speech: Speech normal.         Behavior: Behavior normal.         Thought Content: Thought content normal.         Judgment: Judgment normal. Judgment is not impulsive.        Result Review :                 Assessment and Plan    Diagnoses and all orders for this visit:    1. Primary hypertension (Primary)  -     TSH; Future  -     Comprehensive Metabolic Panel; Future  -     Hemoglobin A1c; Future  -     CBC & Differential; Future  -     Vitamin B12; Future  -     Lipid Panel; Future  -     Microalbumin / Creatinine Urine Ratio - Urine, Clean Catch; Future    2. Mixed hyperlipidemia  -     TSH; Future  -     Comprehensive Metabolic Panel; Future  -     Hemoglobin A1c; Future  -     CBC & Differential; Future  -     Vitamin B12; Future  -     Lipid Panel; Future  -     Microalbumin / Creatinine Urine Ratio - Urine, Clean Catch; Future    3. Type 2 diabetes mellitus without complication, without long-term current use of insulin (HCC)  -     Dulaglutide (Trulicity) 0.75 MG/0.5ML solution pen-injector; Inject 0.75 mg under the skin into the appropriate area as directed 1 (One) Time Per Week.  Dispense: 2 mL; Refill: 2  -     TSH; Future  -     Comprehensive Metabolic Panel; Future  -     Hemoglobin A1c; Future  -     CBC & Differential; Future  -     Vitamin B12; Future  -     Lipid Panel; Future  -     Microalbumin / Creatinine Urine Ratio - Urine, Clean Catch; Future    4. Hypothyroidism, unspecified type  -     TSH; Future  -     Comprehensive Metabolic Panel; Future  -      Hemoglobin A1c; Future  -     CBC & Differential; Future  -     Vitamin B12; Future  -     Lipid Panel; Future  -     Microalbumin / Creatinine Urine Ratio - Urine, Clean Catch; Future    5. Bipolar disorder, in full remission, most recent episode manic (HCC)      Continue current medications     We discussed Trulicity for her type 2 dm, discussed side effects  Please call office if you have issues with this medication     Complete ordered lab work   We will call with results    Continue to follow with psychiatry specialist.     Please call the office if you have any issues        Follow Up   Return in about 4 weeks (around 1/9/2023) for Next scheduled follow up.  Patient was given instructions and counseling regarding her condition or for health maintenance advice. Please see specific information pulled into the AVS if appropriate.           This document has been electronically signed by LUIS EDUARDO Barrios on December 14, 2022 12:24 CST

## 2022-12-21 RX ORDER — LEVOTHYROXINE SODIUM 0.12 MG/1
125 TABLET ORAL DAILY
Qty: 90 TABLET | Refills: 0 | Status: SHIPPED | OUTPATIENT
Start: 2022-12-21 | End: 2023-02-19 | Stop reason: SDUPTHER

## 2023-01-18 ENCOUNTER — LAB (OUTPATIENT)
Dept: LAB | Facility: HOSPITAL | Age: 58
End: 2023-01-18
Payer: MEDICARE

## 2023-01-18 DIAGNOSIS — E11.9 TYPE 2 DIABETES MELLITUS WITHOUT COMPLICATION, WITHOUT LONG-TERM CURRENT USE OF INSULIN: ICD-10-CM

## 2023-01-18 DIAGNOSIS — I10 PRIMARY HYPERTENSION: ICD-10-CM

## 2023-01-18 DIAGNOSIS — E78.2 MIXED HYPERLIPIDEMIA: ICD-10-CM

## 2023-01-18 DIAGNOSIS — E03.9 HYPOTHYROIDISM, UNSPECIFIED TYPE: ICD-10-CM

## 2023-01-18 LAB
ALBUMIN SERPL-MCNC: 4.8 G/DL (ref 3.5–5.2)
ALBUMIN UR-MCNC: 1.3 MG/DL
ALBUMIN/GLOB SERPL: 2.3 G/DL
ALP SERPL-CCNC: 68 U/L (ref 39–117)
ALT SERPL W P-5'-P-CCNC: 28 U/L (ref 1–33)
ANION GAP SERPL CALCULATED.3IONS-SCNC: 9 MMOL/L (ref 5–15)
AST SERPL-CCNC: 19 U/L (ref 1–32)
BASOPHILS # BLD AUTO: 0.06 10*3/MM3 (ref 0–0.2)
BASOPHILS NFR BLD AUTO: 1.2 % (ref 0–1.5)
BILIRUB SERPL-MCNC: 0.3 MG/DL (ref 0–1.2)
BUN SERPL-MCNC: 10 MG/DL (ref 6–20)
BUN/CREAT SERPL: 10.6 (ref 7–25)
CALCIUM SPEC-SCNC: 9.3 MG/DL (ref 8.6–10.5)
CHLORIDE SERPL-SCNC: 106 MMOL/L (ref 98–107)
CHOLEST SERPL-MCNC: 126 MG/DL (ref 0–200)
CO2 SERPL-SCNC: 28 MMOL/L (ref 22–29)
CREAT SERPL-MCNC: 0.94 MG/DL (ref 0.57–1)
CREAT UR-MCNC: 35.7 MG/DL
DEPRECATED RDW RBC AUTO: 40 FL (ref 37–54)
EGFRCR SERPLBLD CKD-EPI 2021: 71.8 ML/MIN/1.73
EOSINOPHIL # BLD AUTO: 0.18 10*3/MM3 (ref 0–0.4)
EOSINOPHIL NFR BLD AUTO: 3.6 % (ref 0.3–6.2)
ERYTHROCYTE [DISTWIDTH] IN BLOOD BY AUTOMATED COUNT: 12.7 % (ref 12.3–15.4)
GLOBULIN UR ELPH-MCNC: 2.1 GM/DL
GLUCOSE SERPL-MCNC: 131 MG/DL (ref 65–99)
HBA1C MFR BLD: 5.8 % (ref 4.8–5.6)
HCT VFR BLD AUTO: 39.8 % (ref 34–46.6)
HDLC SERPL-MCNC: 64 MG/DL (ref 40–60)
HGB BLD-MCNC: 14 G/DL (ref 12–15.9)
IMM GRANULOCYTES # BLD AUTO: 0.03 10*3/MM3 (ref 0–0.05)
IMM GRANULOCYTES NFR BLD AUTO: 0.6 % (ref 0–0.5)
LDLC SERPL CALC-MCNC: 51 MG/DL (ref 0–100)
LDLC/HDLC SERPL: 0.83 {RATIO}
LYMPHOCYTES # BLD AUTO: 1.6 10*3/MM3 (ref 0.7–3.1)
LYMPHOCYTES NFR BLD AUTO: 32.1 % (ref 19.6–45.3)
MCH RBC QN AUTO: 30.6 PG (ref 26.6–33)
MCHC RBC AUTO-ENTMCNC: 35.2 G/DL (ref 31.5–35.7)
MCV RBC AUTO: 87.1 FL (ref 79–97)
MICROALBUMIN/CREAT UR: 36.4 MG/G
MONOCYTES # BLD AUTO: 0.49 10*3/MM3 (ref 0.1–0.9)
MONOCYTES NFR BLD AUTO: 9.8 % (ref 5–12)
NEUTROPHILS NFR BLD AUTO: 2.62 10*3/MM3 (ref 1.7–7)
NEUTROPHILS NFR BLD AUTO: 52.7 % (ref 42.7–76)
NRBC BLD AUTO-RTO: 0 /100 WBC (ref 0–0.2)
PLATELET # BLD AUTO: 215 10*3/MM3 (ref 140–450)
PMV BLD AUTO: 10.4 FL (ref 6–12)
POTASSIUM SERPL-SCNC: 4 MMOL/L (ref 3.5–5.2)
PROT SERPL-MCNC: 6.9 G/DL (ref 6–8.5)
RBC # BLD AUTO: 4.57 10*6/MM3 (ref 3.77–5.28)
SODIUM SERPL-SCNC: 143 MMOL/L (ref 136–145)
TRIGL SERPL-MCNC: 45 MG/DL (ref 0–150)
TSH SERPL DL<=0.05 MIU/L-ACNC: 0.28 UIU/ML (ref 0.27–4.2)
VIT B12 BLD-MCNC: 866 PG/ML (ref 211–946)
VLDLC SERPL-MCNC: 11 MG/DL (ref 5–40)
WBC NRBC COR # BLD: 4.98 10*3/MM3 (ref 3.4–10.8)

## 2023-01-18 PROCEDURE — 82570 ASSAY OF URINE CREATININE: CPT

## 2023-01-18 PROCEDURE — 84443 ASSAY THYROID STIM HORMONE: CPT

## 2023-01-18 PROCEDURE — 82043 UR ALBUMIN QUANTITATIVE: CPT

## 2023-01-18 PROCEDURE — 36415 COLL VENOUS BLD VENIPUNCTURE: CPT

## 2023-01-18 PROCEDURE — 85025 COMPLETE CBC W/AUTO DIFF WBC: CPT

## 2023-01-18 PROCEDURE — 83036 HEMOGLOBIN GLYCOSYLATED A1C: CPT

## 2023-01-18 PROCEDURE — 80053 COMPREHEN METABOLIC PANEL: CPT

## 2023-01-18 PROCEDURE — 82607 VITAMIN B-12: CPT

## 2023-01-18 PROCEDURE — 80061 LIPID PANEL: CPT

## 2023-02-06 ENCOUNTER — OFFICE VISIT (OUTPATIENT)
Dept: FAMILY MEDICINE CLINIC | Facility: CLINIC | Age: 58
End: 2023-02-06
Payer: MEDICARE

## 2023-02-06 VITALS
OXYGEN SATURATION: 98 % | DIASTOLIC BLOOD PRESSURE: 82 MMHG | BODY MASS INDEX: 32.32 KG/M2 | HEART RATE: 73 BPM | WEIGHT: 171.2 LBS | SYSTOLIC BLOOD PRESSURE: 132 MMHG | HEIGHT: 61 IN

## 2023-02-06 DIAGNOSIS — E03.9 HYPOTHYROIDISM, UNSPECIFIED TYPE: ICD-10-CM

## 2023-02-06 DIAGNOSIS — E78.2 MIXED HYPERLIPIDEMIA: ICD-10-CM

## 2023-02-06 DIAGNOSIS — I10 PRIMARY HYPERTENSION: Primary | ICD-10-CM

## 2023-02-06 DIAGNOSIS — E11.9 TYPE 2 DIABETES MELLITUS WITHOUT COMPLICATION, WITHOUT LONG-TERM CURRENT USE OF INSULIN: ICD-10-CM

## 2023-02-06 PROCEDURE — 3044F HG A1C LEVEL LT 7.0%: CPT | Performed by: NURSE PRACTITIONER

## 2023-02-06 PROCEDURE — 99214 OFFICE O/P EST MOD 30 MIN: CPT | Performed by: NURSE PRACTITIONER

## 2023-02-06 RX ORDER — HYDROXYZINE PAMOATE 50 MG/1
50 CAPSULE ORAL
COMMUNITY
Start: 2022-12-14

## 2023-02-06 NOTE — PROGRESS NOTES
Chief Complaint  Follow-up (1 month ) and Hypertension    Subjective          Yahaira Gurrola presents to [facility] [place] for routine follow up. She is currently working on getting financial help with her medications so she can get on a GLP1   Hypertension  This is a chronic problem. The current episode started more than 1 year ago. The problem has been waxing and waning since onset. The problem is uncontrolled. Associated symptoms include anxiety and palpitations. Risk factors for coronary artery disease include diabetes mellitus, dyslipidemia, obesity and stress. Past treatments include angiotensin blockers and beta blockers. Current antihypertension treatment includes angiotensin blockers and beta blockers. The current treatment provides mild improvement.   Diabetes  She presents for her follow-up diabetic visit. She has type 2 diabetes mellitus. Her disease course has been fluctuating. Hypoglycemia symptoms include dizziness and nervousness/anxiousness. Associated symptoms include fatigue. Symptoms are stable. Risk factors for coronary artery disease include dyslipidemia, diabetes mellitus, hypertension and obesity. Current diabetic treatment includes oral agent (dual therapy). She is compliant with treatment most of the time.   Hypothyroidism  This is a chronic problem. The current episode started more than 1 year ago. The problem occurs constantly. Associated symptoms include fatigue. Nothing aggravates the symptoms. Treatments tried: levothyroxine  The treatment provided mild relief.   Hyperlipidemia  This is a chronic problem. The current episode started more than 1 year ago. Exacerbating diseases include diabetes, hypothyroidism and obesity. Factors aggravating her hyperlipidemia include beta blockers. Current antihyperlipidemic treatment includes statins. The current treatment provides mild improvement of lipids. Risk factors for coronary artery  disease include dyslipidemia, obesity and hypertension.   Anxiety  Presents for follow-up visit. The problem has been waxing and waning. Symptoms include dizziness, nervous/anxious behavior and palpitations. Symptoms occur most days. The severity of symptoms is moderate.     Her past medical history is significant for anxiety/panic attacks and bipolar disorder. Compliance with prior treatments has been variable.     Outpatient Medications Prior to Visit   Medication Sig Dispense Refill   • bethanechol (URECHOLINE) 50 MG tablet Take 1 tablet by mouth 3 (Three) Times a Day.     • Blood Glucose Monitoring Suppl kit Use daily to check glucose levels 1 each 0   • conjugated estrogens (Premarin) 0.625 MG/GM vaginal cream Insert 0.5g (pea-sized amount) in the vagina twice per week at night. 30 g 3   • ergocalciferol (ERGOCALCIFEROL) 1.25 MG (77048 UT) capsule TAKE 1 CAPSULE BY MOUTH EVERY OTHER WEEK     • glucose blood test strip Use as instructed 100 each 11   • hydrOXYzine (VISTARIL) 25 MG capsule Take 50 mg by mouth 3 (Three) Times a Day As Needed for Itching.     • hydrOXYzine pamoate (VISTARIL) 50 MG capsule Take 50 mg by mouth.     • lamoTRIgine (LaMICtal) 200 MG tablet Take 200 mg by mouth.     • Lancets (freestyle) lancets Use daily to check blood glucose levels 100 each 11   • levothyroxine (SYNTHROID, LEVOTHROID) 125 MCG tablet Take 1 tablet by mouth Daily. 90 tablet 0   • linaclotide (LINZESS) 290 MCG capsule capsule Take 290 mcg by mouth Daily.     • losartan (COZAAR) 50 MG tablet Take 50 mg by mouth Daily.     • magnesium oxide (MAG-OX) 400 MG tablet Take 400 mg by mouth Daily.     • metFORMIN ER (GLUCOPHAGE-XR) 500 MG 24 hr tablet Take 2 tablets by mouth 2 (Two) Times a Day. 120 tablet 3   • propranolol (INDERAL) 10 MG tablet Take 20 mg by mouth Daily.     • QUEtiapine XR (SEROquel XR) 400 MG 24 hr tablet Take 300 mg by mouth Every Night.     • rosuvastatin (Crestor) 10 MG tablet Take 1 tablet by mouth Daily.  "30 tablet 11   • tamsulosin (FLOMAX) 0.4 MG capsule 24 hr capsule Take 0.4 mg by mouth Daily.     • bethanechol (URECHOLINE) 50 MG tablet      • cyanocobalamin 1000 MCG/ML injection Inject 1,000 mcg into the appropriate muscle as directed by prescriber.     • Dulaglutide (Trulicity) 0.75 MG/0.5ML solution pen-injector Inject 0.75 mg under the skin into the appropriate area as directed 1 (One) Time Per Week. 2 mL 2   • fluconazole (DIFLUCAN) 150 MG tablet Take 150 mg by mouth.     • fluticasone (FLONASE) 50 MCG/ACT nasal spray SMARTSIG:Spray(s) Both Nares     • glipizide (GLUCOTROL XL) 5 MG ER tablet      • lamoTRIgine (LaMICtal) 200 MG tablet      • nystatin (MYCOSTATIN) 954313 UNIT/GM cream Apply  topically to the appropriate area as directed.     • oxybutynin (DITROPAN) 5 MG tablet Take 5 mg by mouth.       No facility-administered medications prior to visit.       Review of Systems   Constitutional: Positive for fatigue.   Cardiovascular: Positive for palpitations.   Neurological: Positive for dizziness.   Psychiatric/Behavioral: The patient is nervous/anxious.          Objective   Vital Signs:   Visit Vitals  /82   Pulse 73   Ht 154.9 cm (60.98\")   Wt 77.7 kg (171 lb 3.2 oz)   SpO2 98%   BMI 32.36 kg/m²     Physical Exam  Vitals and nursing note reviewed.   Constitutional:       Appearance: She is well-developed.   HENT:      Head: Normocephalic and atraumatic.   Eyes:      General: Lids are normal.      Conjunctiva/sclera: Conjunctivae normal.   Neck:      Thyroid: No thyroid mass or thyromegaly.      Trachea: Trachea normal. No tracheal tenderness.   Cardiovascular:      Rate and Rhythm: Normal rate.      Pulses: Normal pulses.      Heart sounds: Normal heart sounds.   Pulmonary:      Effort: Pulmonary effort is normal. No respiratory distress.      Breath sounds: Normal breath sounds. No wheezing.   Abdominal:      General: There is no distension.      Palpations: Abdomen is soft. There is no mass. "   Musculoskeletal:         General: Normal range of motion.      Cervical back: Normal range of motion. No edema.   Skin:     General: Skin is warm and dry.      Coloration: Skin is not pale.      Findings: No abrasion, erythema or lesion.   Neurological:      Mental Status: She is alert and oriented to person, place, and time.   Psychiatric:         Mood and Affect: Mood is not anxious. Affect is not inappropriate.         Speech: Speech normal.         Behavior: Behavior normal.         Thought Content: Thought content normal.         Judgment: Judgment normal. Judgment is not impulsive.        Result Review :     Common labs    Common Labs 8/18/22 8/18/22 8/18/22 8/18/22 1/18/23 1/18/23 1/18/23 1/18/23 1/18/23    [id number] [id number] [id number] 0955 1106   Glucose   129 (A)    131 (A)     BUN   13    10     Creatinine   0.99    0.94     Sodium   142    143     Potassium   3.9    4.0     Chloride   106    106     Calcium   9.3    9.3     Albumin   4.80    4.8     Total Bilirubin   0.3    0.3     Alkaline Phosphatase   73    68     AST (SGOT)   18    19     ALT (SGPT)   28    28     WBC 6.08    4.98       Hemoglobin 13.9    14.0       Hematocrit 39.9    39.8       Platelets 207    215       Total Cholesterol    202 (A)    126    Triglycerides    60    45    HDL Cholesterol    69 (A)    64 (A)    LDL Cholesterol     122 (A)    51    Hemoglobin A1C  6.00 (A)    5.80 (A)      Microalbumin, Urine         1.3   (A) Abnormal value                      Assessment and Plan    Diagnoses and all orders for this visit:    1. Primary hypertension (Primary)    2. Mixed hyperlipidemia    3. Type 2 diabetes mellitus without complication, without long-term current use of insulin (HCC)    4. Hypothyroidism, unspecified type      Lab work discussed and reviewed     I would like her to go on a GLP1 however this is too expensive for her at the moment, she is working on getting her insurance changed    We will continue to monitor  and she will let me know when all changes are final.     Continue current medication     Continue current diet and monitoring blood glucose levels and blood pressure    No changes to medications today         Follow Up   Return in 3 months (on 5/6/2023), or if symptoms worsen or fail to improve, for Next scheduled follow up.  Patient was given instructions and counseling regarding her condition or for health maintenance advice. Please see specific information pulled into the AVS if appropriate.           This document has been electronically signed by LUIS EDUARDO Barrios on February 7, 2023 09:14 CST

## 2023-02-08 NOTE — TELEPHONE ENCOUNTER
Incoming Refill Request      Medication requested (name and dose):     Pharmacy where request should be sent:     Additional details provided by patient:     Best call back number:     Does the patient have less than a 3 day supply:  [] Yes  [] No    Mary Yin MA  02/08/23, 16:10 CST

## 2023-02-20 RX ORDER — LOSARTAN POTASSIUM 50 MG/1
50 TABLET ORAL DAILY
Qty: 30 TABLET | Refills: 3 | Status: SHIPPED | OUTPATIENT
Start: 2023-02-20

## 2023-02-20 RX ORDER — LEVOTHYROXINE SODIUM 0.12 MG/1
125 TABLET ORAL DAILY
Qty: 90 TABLET | Refills: 0 | Status: SHIPPED | OUTPATIENT
Start: 2023-02-20

## 2023-06-05 RX ORDER — LOSARTAN POTASSIUM 50 MG/1
TABLET ORAL
Qty: 30 TABLET | Refills: 2 | Status: SHIPPED | OUTPATIENT
Start: 2023-06-05

## 2023-08-02 ENCOUNTER — TELEPHONE (OUTPATIENT)
Dept: FAMILY MEDICINE CLINIC | Facility: CLINIC | Age: 58
End: 2023-08-02
Payer: MEDICARE

## (undated) DEVICE — CANN SMPL SOFTECH BIFLO ETCO2 A/M 7FT